# Patient Record
Sex: FEMALE | Race: WHITE | NOT HISPANIC OR LATINO | Employment: UNEMPLOYED | URBAN - METROPOLITAN AREA
[De-identification: names, ages, dates, MRNs, and addresses within clinical notes are randomized per-mention and may not be internally consistent; named-entity substitution may affect disease eponyms.]

---

## 2021-06-06 ENCOUNTER — HISTORICAL (OUTPATIENT)
Dept: ADMINISTRATIVE | Facility: HOSPITAL | Age: 26
End: 2021-06-06

## 2022-04-10 ENCOUNTER — HISTORICAL (OUTPATIENT)
Dept: ADMINISTRATIVE | Facility: HOSPITAL | Age: 27
End: 2022-04-10

## 2022-04-28 VITALS
OXYGEN SATURATION: 100 % | SYSTOLIC BLOOD PRESSURE: 113 MMHG | DIASTOLIC BLOOD PRESSURE: 70 MMHG | HEIGHT: 65 IN | BODY MASS INDEX: 19.98 KG/M2 | WEIGHT: 119.94 LBS

## 2023-09-24 ENCOUNTER — HOSPITAL ENCOUNTER (OUTPATIENT)
Facility: HOSPITAL | Age: 28
Discharge: HOME | End: 2023-09-25
Attending: EMERGENCY MEDICINE | Admitting: OBSTETRICS & GYNECOLOGY
Payer: MEDICAID

## 2023-09-24 DIAGNOSIS — O26.899 ABDOMINAL PAIN AFFECTING PREGNANCY: ICD-10-CM

## 2023-09-24 DIAGNOSIS — R10.9 ABDOMINAL PAIN AFFECTING PREGNANCY: ICD-10-CM

## 2023-09-24 DIAGNOSIS — R10.31 RLQ ABDOMINAL PAIN: ICD-10-CM

## 2023-09-24 DIAGNOSIS — O20.0 THREATENED MISCARRIAGE: Primary | ICD-10-CM

## 2023-09-24 DIAGNOSIS — O36.80X0 PREGNANCY OF UNKNOWN ANATOMIC LOCATION: ICD-10-CM

## 2023-09-24 PROBLEM — D25.9 UTERINE FIBROID DURING PREGNANCY, ANTEPARTUM: Status: ACTIVE | Noted: 2023-09-24

## 2023-09-24 PROBLEM — O26.91 ABNORMAL PREGNANCY IN FIRST TRIMESTER: Status: ACTIVE | Noted: 2023-09-24

## 2023-09-24 PROBLEM — Z87.42 HISTORY OF OVARIAN CYST: Status: ACTIVE | Noted: 2023-09-24

## 2023-09-24 PROBLEM — O20.9 VAGINAL BLEEDING AFFECTING EARLY PREGNANCY: Status: ACTIVE | Noted: 2023-09-24

## 2023-09-24 PROBLEM — O34.10 UTERINE FIBROID DURING PREGNANCY, ANTEPARTUM: Status: ACTIVE | Noted: 2023-09-24

## 2023-09-24 LAB
ALBUMIN SERPL-MCNC: 4.5 G/DL (ref 3.5–5)
ALBUMIN/GLOB SERPL: 1.5 RATIO (ref 1.1–2)
ALP SERPL-CCNC: 39 UNIT/L (ref 40–150)
ALT SERPL-CCNC: 13 UNIT/L (ref 0–55)
AMPHET UR QL SCN: NEGATIVE
APPEARANCE UR: ABNORMAL
APTT PPP: 27.9 SECONDS (ref 23.2–33.7)
AST SERPL-CCNC: 17 UNIT/L (ref 5–34)
B-HCG FREE SERPL-ACNC: 4390.86 MIU/ML
B-HCG FREE SERPL-ACNC: 5884.78 MIU/ML
B-HCG SERPL QL: POSITIVE
BACTERIA #/AREA URNS AUTO: ABNORMAL /HPF
BARBITURATE SCN PRESENT UR: NEGATIVE
BASOPHILS # BLD AUTO: 0.06 X10(3)/MCL
BASOPHILS # BLD AUTO: 0.09 X10(3)/MCL
BASOPHILS # BLD AUTO: 0.16 X10(3)/MCL
BASOPHILS NFR BLD AUTO: 0.5 %
BASOPHILS NFR BLD AUTO: 0.9 %
BASOPHILS NFR BLD AUTO: 1.4 %
BENZODIAZ UR QL SCN: NEGATIVE
BILIRUB SERPL-MCNC: 0.5 MG/DL
BILIRUB UR QL STRIP.AUTO: NEGATIVE
BUN SERPL-MCNC: 5.9 MG/DL (ref 7–18.7)
C TRACH DNA SPEC QL NAA+PROBE: NOT DETECTED
CALCIUM SERPL-MCNC: 9.4 MG/DL (ref 8.4–10.2)
CANNABINOIDS UR QL SCN: POSITIVE
CHLORIDE SERPL-SCNC: 108 MMOL/L (ref 98–107)
CO2 SERPL-SCNC: 20 MMOL/L (ref 22–29)
COCAINE UR QL SCN: NEGATIVE
COLOR UR AUTO: YELLOW
CREAT SERPL-MCNC: 0.87 MG/DL (ref 0.55–1.02)
EOSINOPHIL # BLD AUTO: 0 X10(3)/MCL (ref 0–0.9)
EOSINOPHIL # BLD AUTO: 0.01 X10(3)/MCL (ref 0–0.9)
EOSINOPHIL # BLD AUTO: 0.17 X10(3)/MCL (ref 0–0.9)
EOSINOPHIL NFR BLD AUTO: 0 %
EOSINOPHIL NFR BLD AUTO: 0.1 %
EOSINOPHIL NFR BLD AUTO: 1.5 %
ERYTHROCYTE [DISTWIDTH] IN BLOOD BY AUTOMATED COUNT: 12.1 % (ref 11.5–17)
ERYTHROCYTE [DISTWIDTH] IN BLOOD BY AUTOMATED COUNT: 12.1 % (ref 11.5–17)
ERYTHROCYTE [DISTWIDTH] IN BLOOD BY AUTOMATED COUNT: 12.3 % (ref 11.5–17)
FENTANYL UR QL SCN: NEGATIVE
FIBRINOGEN PPP-MCNC: 259 MG/DL (ref 210–463)
GFR SERPLBLD CREATININE-BSD FMLA CKD-EPI: >60 MLS/MIN/1.73/M2
GLOBULIN SER-MCNC: 3 GM/DL (ref 2.4–3.5)
GLUCOSE SERPL-MCNC: 147 MG/DL (ref 74–100)
GLUCOSE UR QL STRIP.AUTO: NEGATIVE
GROUP & RH: NORMAL
HCT VFR BLD AUTO: 32.5 % (ref 37–47)
HCT VFR BLD AUTO: 33.1 % (ref 37–47)
HCT VFR BLD AUTO: 43.9 % (ref 37–47)
HGB BLD-MCNC: 11.5 G/DL (ref 12–16)
HGB BLD-MCNC: 11.8 G/DL (ref 12–16)
HGB BLD-MCNC: 15.3 G/DL (ref 12–16)
IMM GRANULOCYTES # BLD AUTO: 0.01 X10(3)/MCL (ref 0–0.04)
IMM GRANULOCYTES # BLD AUTO: 0.04 X10(3)/MCL (ref 0–0.04)
IMM GRANULOCYTES # BLD AUTO: 0.04 X10(3)/MCL (ref 0–0.04)
IMM GRANULOCYTES NFR BLD AUTO: 0.1 %
IMM GRANULOCYTES NFR BLD AUTO: 0.4 %
IMM GRANULOCYTES NFR BLD AUTO: 0.4 %
INR PPP: 1.1
KETONES UR QL STRIP.AUTO: NEGATIVE
LEUKOCYTE ESTERASE UR QL STRIP.AUTO: NEGATIVE
LYMPHOCYTES # BLD AUTO: 2.4 X10(3)/MCL (ref 0.6–4.6)
LYMPHOCYTES # BLD AUTO: 3.16 X10(3)/MCL (ref 0.6–4.6)
LYMPHOCYTES # BLD AUTO: 7.02 X10(3)/MCL (ref 0.6–4.6)
LYMPHOCYTES NFR BLD AUTO: 21.4 %
LYMPHOCYTES NFR BLD AUTO: 32.1 %
LYMPHOCYTES NFR BLD AUTO: 61.2 %
MCH RBC QN AUTO: 32.2 PG (ref 27–31)
MCH RBC QN AUTO: 32.4 PG (ref 27–31)
MCH RBC QN AUTO: 32.5 PG (ref 27–31)
MCHC RBC AUTO-ENTMCNC: 34.9 G/DL (ref 33–36)
MCHC RBC AUTO-ENTMCNC: 35.4 G/DL (ref 33–36)
MCHC RBC AUTO-ENTMCNC: 35.6 G/DL (ref 33–36)
MCV RBC AUTO: 90.9 FL (ref 80–94)
MCV RBC AUTO: 91.8 FL (ref 80–94)
MCV RBC AUTO: 92.4 FL (ref 80–94)
MDMA UR QL SCN: NEGATIVE
MONOCYTES # BLD AUTO: 0.49 X10(3)/MCL (ref 0.1–1.3)
MONOCYTES # BLD AUTO: 0.71 X10(3)/MCL (ref 0.1–1.3)
MONOCYTES # BLD AUTO: 0.96 X10(3)/MCL (ref 0.1–1.3)
MONOCYTES NFR BLD AUTO: 4.4 %
MONOCYTES NFR BLD AUTO: 7.2 %
MONOCYTES NFR BLD AUTO: 8.4 %
N GONORRHOEA DNA SPEC QL NAA+PROBE: NOT DETECTED
NEUTROPHILS # BLD AUTO: 3.15 X10(3)/MCL (ref 2.1–9.2)
NEUTROPHILS # BLD AUTO: 5.82 X10(3)/MCL (ref 2.1–9.2)
NEUTROPHILS # BLD AUTO: 8.24 X10(3)/MCL (ref 2.1–9.2)
NEUTROPHILS NFR BLD AUTO: 27.4 %
NEUTROPHILS NFR BLD AUTO: 59.3 %
NEUTROPHILS NFR BLD AUTO: 73.3 %
NITRITE UR QL STRIP.AUTO: NEGATIVE
NRBC BLD AUTO-RTO: 0 %
OPIATES UR QL SCN: NEGATIVE
PCP UR QL: NEGATIVE
PH UR STRIP.AUTO: 8.5 [PH]
PH UR: 8.5 [PH] (ref 3–11)
PLATELET # BLD AUTO: 244 X10(3)/MCL (ref 130–400)
PLATELET # BLD AUTO: 257 X10(3)/MCL (ref 130–400)
PLATELET # BLD AUTO: 394 X10(3)/MCL (ref 130–400)
PMV BLD AUTO: 10 FL (ref 7.4–10.4)
PMV BLD AUTO: 10.2 FL (ref 7.4–10.4)
PMV BLD AUTO: 9.9 FL (ref 7.4–10.4)
POTASSIUM SERPL-SCNC: 4.2 MMOL/L (ref 3.5–5.1)
PROGEST SERPL-MCNC: 3 NG/ML
PROT SERPL-MCNC: 7.5 GM/DL (ref 6.4–8.3)
PROT UR QL STRIP.AUTO: NEGATIVE
PROTHROMBIN TIME: 14.2 SECONDS (ref 12.5–14.5)
RBC # BLD AUTO: 3.54 X10(6)/MCL (ref 4.2–5.4)
RBC # BLD AUTO: 3.64 X10(6)/MCL (ref 4.2–5.4)
RBC # BLD AUTO: 4.75 X10(6)/MCL (ref 4.2–5.4)
RBC #/AREA URNS AUTO: <5 /HPF
RBC UR QL AUTO: ABNORMAL
SODIUM SERPL-SCNC: 140 MMOL/L (ref 136–145)
SOURCE (OHS): NORMAL
SP GR UR STRIP.AUTO: 1.01 (ref 1–1.03)
SQUAMOUS #/AREA URNS AUTO: 7 /HPF
UROBILINOGEN UR STRIP-ACNC: 0.2
WBC # SPEC AUTO: 11.23 X10(3)/MCL (ref 4.5–11.5)
WBC # SPEC AUTO: 11.47 X10(3)/MCL (ref 4.5–11.5)
WBC # SPEC AUTO: 9.83 X10(3)/MCL (ref 4.5–11.5)
WBC #/AREA URNS AUTO: <5 /HPF

## 2023-09-24 PROCEDURE — 81001 URINALYSIS AUTO W/SCOPE: CPT | Mod: 59 | Performed by: PHYSICIAN ASSISTANT

## 2023-09-24 PROCEDURE — 84702 CHORIONIC GONADOTROPIN TEST: CPT | Mod: 91 | Performed by: OBSTETRICS & GYNECOLOGY

## 2023-09-24 PROCEDURE — 99285 EMERGENCY DEPT VISIT HI MDM: CPT | Mod: 25

## 2023-09-24 PROCEDURE — 63600175 PHARM REV CODE 636 W HCPCS: Performed by: EMERGENCY MEDICINE

## 2023-09-24 PROCEDURE — 96376 TX/PRO/DX INJ SAME DRUG ADON: CPT

## 2023-09-24 PROCEDURE — 85730 THROMBOPLASTIN TIME PARTIAL: CPT | Performed by: OBSTETRICS & GYNECOLOGY

## 2023-09-24 PROCEDURE — 63600175 PHARM REV CODE 636 W HCPCS: Performed by: PHYSICIAN ASSISTANT

## 2023-09-24 PROCEDURE — 84144 ASSAY OF PROGESTERONE: CPT | Performed by: OBSTETRICS & GYNECOLOGY

## 2023-09-24 PROCEDURE — 63600175 PHARM REV CODE 636 W HCPCS: Performed by: OBSTETRICS & GYNECOLOGY

## 2023-09-24 PROCEDURE — 96375 TX/PRO/DX INJ NEW DRUG ADDON: CPT

## 2023-09-24 PROCEDURE — G0378 HOSPITAL OBSERVATION PER HR: HCPCS

## 2023-09-24 PROCEDURE — 85610 PROTHROMBIN TIME: CPT | Performed by: OBSTETRICS & GYNECOLOGY

## 2023-09-24 PROCEDURE — 86901 BLOOD TYPING SEROLOGIC RH(D): CPT | Performed by: EMERGENCY MEDICINE

## 2023-09-24 PROCEDURE — 85384 FIBRINOGEN ACTIVITY: CPT | Performed by: OBSTETRICS & GYNECOLOGY

## 2023-09-24 PROCEDURE — 87591 N.GONORRHOEAE DNA AMP PROB: CPT | Performed by: OBSTETRICS & GYNECOLOGY

## 2023-09-24 PROCEDURE — 80307 DRUG TEST PRSMV CHEM ANLYZR: CPT | Performed by: OBSTETRICS & GYNECOLOGY

## 2023-09-24 PROCEDURE — 80053 COMPREHEN METABOLIC PANEL: CPT | Performed by: PHYSICIAN ASSISTANT

## 2023-09-24 PROCEDURE — 96361 HYDRATE IV INFUSION ADD-ON: CPT

## 2023-09-24 PROCEDURE — 25000003 PHARM REV CODE 250: Performed by: OBSTETRICS & GYNECOLOGY

## 2023-09-24 PROCEDURE — 85025 COMPLETE CBC W/AUTO DIFF WBC: CPT | Mod: 91 | Performed by: OBSTETRICS & GYNECOLOGY

## 2023-09-24 PROCEDURE — 84702 CHORIONIC GONADOTROPIN TEST: CPT | Performed by: EMERGENCY MEDICINE

## 2023-09-24 PROCEDURE — 81025 URINE PREGNANCY TEST: CPT | Performed by: PHYSICIAN ASSISTANT

## 2023-09-24 PROCEDURE — 96374 THER/PROPH/DIAG INJ IV PUSH: CPT

## 2023-09-24 PROCEDURE — 85025 COMPLETE CBC W/AUTO DIFF WBC: CPT | Performed by: PHYSICIAN ASSISTANT

## 2023-09-24 RX ORDER — DEXTROAMPHETAMINE SACCHARATE, AMPHETAMINE ASPARTATE, DEXTROAMPHETAMINE SULFATE AND AMPHETAMINE SULFATE 7.5; 7.5; 7.5; 7.5 MG/1; MG/1; MG/1; MG/1
1 TABLET ORAL 2 TIMES DAILY
Status: ON HOLD | COMMUNITY
Start: 2023-07-12 | End: 2023-09-25 | Stop reason: HOSPADM

## 2023-09-24 RX ORDER — SODIUM CHLORIDE 0.9 % (FLUSH) 0.9 %
10 SYRINGE (ML) INJECTION
Status: DISCONTINUED | OUTPATIENT
Start: 2023-09-24 | End: 2023-09-25 | Stop reason: HOSPADM

## 2023-09-24 RX ORDER — ONDANSETRON 4 MG/1
8 TABLET, ORALLY DISINTEGRATING ORAL EVERY 8 HOURS PRN
Status: DISCONTINUED | OUTPATIENT
Start: 2023-09-24 | End: 2023-09-25 | Stop reason: HOSPADM

## 2023-09-24 RX ORDER — ONDANSETRON 2 MG/ML
4 INJECTION INTRAMUSCULAR; INTRAVENOUS
Status: COMPLETED | OUTPATIENT
Start: 2023-09-24 | End: 2023-09-24

## 2023-09-24 RX ORDER — DEXTROAMPHETAMINE SACCHARATE, AMPHETAMINE ASPARTATE, DEXTROAMPHETAMINE SULFATE AND AMPHETAMINE SULFATE 5; 5; 5; 5 MG/1; MG/1; MG/1; MG/1
1 TABLET ORAL 3 TIMES DAILY
COMMUNITY
Start: 2023-09-15

## 2023-09-24 RX ORDER — HYDROMORPHONE HYDROCHLORIDE 2 MG/ML
1 INJECTION, SOLUTION INTRAMUSCULAR; INTRAVENOUS; SUBCUTANEOUS
Status: COMPLETED | OUTPATIENT
Start: 2023-09-24 | End: 2023-09-24

## 2023-09-24 RX ORDER — PROCHLORPERAZINE EDISYLATE 5 MG/ML
5 INJECTION INTRAMUSCULAR; INTRAVENOUS EVERY 6 HOURS PRN
Status: DISCONTINUED | OUTPATIENT
Start: 2023-09-24 | End: 2023-09-25 | Stop reason: HOSPADM

## 2023-09-24 RX ORDER — HYDROMORPHONE HYDROCHLORIDE 2 MG/ML
0.5 INJECTION, SOLUTION INTRAMUSCULAR; INTRAVENOUS; SUBCUTANEOUS
Status: DISCONTINUED | OUTPATIENT
Start: 2023-09-24 | End: 2023-09-25 | Stop reason: HOSPADM

## 2023-09-24 RX ORDER — IBUPROFEN 600 MG/1
600 TABLET ORAL EVERY 8 HOURS PRN
Status: DISCONTINUED | OUTPATIENT
Start: 2023-09-24 | End: 2023-09-25 | Stop reason: HOSPADM

## 2023-09-24 RX ORDER — DEXTROAMPHETAMINE SACCHARATE, AMPHETAMINE ASPARTATE MONOHYDRATE, DEXTROAMPHETAMINE SULFATE AND AMPHETAMINE SULFATE 7.5; 7.5; 7.5; 7.5 MG/1; MG/1; MG/1; MG/1
30 CAPSULE, EXTENDED RELEASE ORAL
Status: ON HOLD | COMMUNITY
End: 2023-09-25 | Stop reason: HOSPADM

## 2023-09-24 RX ORDER — ACETAMINOPHEN 325 MG/1
650 TABLET ORAL EVERY 8 HOURS PRN
Status: DISCONTINUED | OUTPATIENT
Start: 2023-09-24 | End: 2023-09-25 | Stop reason: HOSPADM

## 2023-09-24 RX ORDER — BUPROPION HYDROCHLORIDE 150 MG/1
150 TABLET ORAL EVERY MORNING
COMMUNITY
Start: 2023-04-03

## 2023-09-24 RX ADMIN — ONDANSETRON 4 MG: 2 INJECTION INTRAMUSCULAR; INTRAVENOUS at 11:09

## 2023-09-24 RX ADMIN — ACETAMINOPHEN 650 MG: 325 TABLET, FILM COATED ORAL at 04:09

## 2023-09-24 RX ADMIN — PROCHLORPERAZINE EDISYLATE 5 MG: 5 INJECTION INTRAMUSCULAR; INTRAVENOUS at 08:09

## 2023-09-24 RX ADMIN — SODIUM CHLORIDE, POTASSIUM CHLORIDE, SODIUM LACTATE AND CALCIUM CHLORIDE 1000 ML: 600; 310; 30; 20 INJECTION, SOLUTION INTRAVENOUS at 11:09

## 2023-09-24 RX ADMIN — HYDROMORPHONE HYDROCHLORIDE 1 MG: 2 INJECTION INTRAMUSCULAR; INTRAVENOUS; SUBCUTANEOUS at 12:09

## 2023-09-24 RX ADMIN — SODIUM CHLORIDE, POTASSIUM CHLORIDE, SODIUM LACTATE AND CALCIUM CHLORIDE 1000 ML: 600; 310; 30; 20 INJECTION, SOLUTION INTRAVENOUS at 12:09

## 2023-09-24 RX ADMIN — HYDROMORPHONE HYDROCHLORIDE 1 MG: 2 INJECTION INTRAMUSCULAR; INTRAVENOUS; SUBCUTANEOUS at 11:09

## 2023-09-24 NOTE — FIRST PROVIDER EVALUATION
"Medical screening examination initiated.  I have conducted a focused provider triage encounter, findings are as follows:    Brief history of present illness:  29 yo female presents to ED for evaluation of lower abdominal pain and vaginal bleeding for the past hour. LMP 1 week ago.     Vitals:    09/24/23 1055   BP: (!) 145/86   Pulse: (!) 127   Resp: (!) 26   Temp: 97.5 °F (36.4 °C)   TempSrc: Tympanic   SpO2: 99%   Weight: 54.4 kg (120 lb)   Height: 5' 6" (1.676 m)       Pertinent physical exam:  Patient is awake and alert and oriented.  Ambulatory to triage.  In no acute distress.      Brief workup plan:  labs, UA, UPT, IVF    Preliminary workup initiated; this workup will be continued and followed by the physician or advanced practice provider that is assigned to the patient when roomed.  "

## 2023-09-24 NOTE — Clinical Note
Diagnosis: Threatened miscarriage [[id number]]   Future Attending Provider: KWAME RIOS [[id number]]   Admitting Provider:: KWAME RIOS [[id number]]   Bed request comments: Please place an
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
## 2023-09-24 NOTE — PROGRESS NOTES
Serial abdominal exam and repeat labs    Pt says her pain has improved significantly, took ibuprofen/tylenol a few hours ago. Rates her pain 4/10. Worse with ambulation. No nausea or vomiting. No weakness, fatigue, dizziness with ambulation. Did have some bleeding when using bathroom, dark colored.     Temp:  [97.5 °F (36.4 °C)-98.1 °F (36.7 °C)] 98.1 °F (36.7 °C)  Pulse:  [] 75  Resp:  [16-27] 18  SpO2:  [96 %-100 %] 96 %  BP: (100-145)/(64-99) 100/64    GEN NAD, AAOx3  PULM unlabored  ABD S/NTTP/no rebound or guarding  EXT no C/C/E BLE      Recent Results (from the past 336 hour(s))   CBC with Differential    Collection Time: 23  5:21 PM   Result Value Ref Range    WBC 11.23 4.50 - 11.50 x10(3)/mcL    Hgb 11.8 (L) 12.0 - 16.0 g/dL    Hct 33.1 (L) 37.0 - 47.0 %    Platelet 257 130 - 400 x10(3)/mcL   CBC with Differential    Collection Time: 23 11:03 AM   Result Value Ref Range    WBC 11.47 4.50 - 11.50 x10(3)/mcL    Hgb 15.3 12.0 - 16.0 g/dL    Hct 43.9 37.0 - 47.0 %    Platelet 394 130 - 400 x10(3)/mcL     UDS shows THC+  Fibrinogen 259  Progesterone 3    PTT/INR &PT pending    A/P  under observation with current working diagnosis of early pregnancy of unknown location PUL in setting of VB, abdominal pain (Ddx: IUP with possible ruptured ovarian cysts causing pain on that side, threatened miscarriage or an ectopic fallopian tubal pregnancy or heterotopic pregnancy). Pt refuses to allow TVUS for better imaging of possible adnexal mass/fibroid. There is a questionable GS in NATHANIEL (no FP or YS).  - received 2L IV fluids in ED, possible hemoconcentration for the drop with her H/H and plts on CBC  - pt NPO currently and will repeat CBC earlier than planned, time changed from 11pm to 8pm  - pain is improved, VSSAF, plan to recheck BHCG quant as well  - again discussed plan to proceed with dx laparoscopy for any clinical or lab changes, with concern for possible rupturing tubal ectopic pregnancy.   Pt agrees although she said she does feel very hungry and wishes to eat if the labs are stable on repeat check.    Ofelia Freedman MD  OB/GYN Hospitalist  9/24/2023  6:43 PM

## 2023-09-24 NOTE — ED PROVIDER NOTES
Encounter Date: 9/24/2023    SCRIBE #1 NOTE: I, Julissa Lockhart, am scribing for, and in the presence of,  Abdifatah Penny III, MD. I have scribed the following portions of the note - Other sections scribed: HPI, ROS, PE, MDM.       History     Chief Complaint   Patient presents with    Abdominal Pain     Lower abdominal pain with vaginal bleeding. LMP last week. Denies dysuria. Denies BC use     28 year old female with a history of ruptured uterine cyst presents to ED complaining of severe RLQ abdominal pain and vaginal bleeding that began 30 minutes ago. Her friend, at bedside, says that there is a chance she could be pregnant.  Pt says that she had vaginal bleeding similar to a period last week.      The history is provided by the patient and a friend. No  was used.   Abdominal Pain  The current episode started just prior to arrival. The onset of the illness was abrupt. The abdominal pain is located in the RLQ. The abdominal pain is relieved by nothing. The other symptoms of the illness include vaginal bleeding. The other symptoms of the illness do not include fever, fatigue, shortness of breath, nausea, vomiting, diarrhea or dysuria.   Symptoms associated with the illness do not include constipation or back pain.     Review of patient's allergies indicates:  Not on File  No past medical history on file.  No past surgical history on file.  No family history on file.     Review of Systems   Constitutional:  Negative for fatigue, fever and unexpected weight change.   HENT:  Negative for congestion and rhinorrhea.    Eyes:  Negative for pain.   Respiratory:  Negative for chest tightness, shortness of breath and wheezing.    Cardiovascular:  Negative for chest pain.   Gastrointestinal:  Positive for abdominal pain. Negative for constipation, diarrhea, nausea and vomiting.   Genitourinary:  Positive for vaginal bleeding. Negative for dysuria.   Musculoskeletal:  Negative for back pain and neck  pain.   Skin:  Negative for rash.   Allergic/Immunologic: Negative for environmental allergies, food allergies and immunocompromised state.   Neurological:  Negative for dizziness and speech difficulty.   Hematological:  Does not bruise/bleed easily.   Psychiatric/Behavioral:  Negative for sleep disturbance and suicidal ideas.        Physical Exam     Initial Vitals [09/24/23 1055]   BP Pulse Resp Temp SpO2   (!) 145/86 (!) 127 (!) 26 97.5 °F (36.4 °C) 99 %      MAP       --         Physical Exam    Nursing note and vitals reviewed.  Constitutional: No distress.   Screaming in pain   HENT:   Head: Normocephalic and atraumatic.   Neck: Trachea normal.   Cardiovascular:  Normal rate and regular rhythm.           No murmur heard.  Pulmonary/Chest: Breath sounds normal. No respiratory distress.   Abdominal: Abdomen is soft. Bowel sounds are normal. She exhibits no distension. There is generalized abdominal tenderness.   Musculoskeletal:         General: Normal range of motion.      Lumbar back: Normal range of motion.     Neurological: She is alert and oriented to person, place, and time. She has normal strength. No cranial nerve deficit.   Skin: Skin is warm and dry. No rash noted.   Psychiatric: She has a normal mood and affect. Judgment normal.         ED Course   Critical Care    Date/Time: 9/24/2023 2:26 PM    Performed by: Abdifatah Penny III, MD  Authorized by: Abdifatah Penny III, MD  Direct patient critical care time: 25 minutes  Documentation critical care time: 5 minutes  Consulting other physicians critical care time: 5 minutes  Total critical care time (exclusive of procedural time) : 35 minutes  Critical care was necessary to treat or prevent imminent or life-threatening deterioration of the following conditions: metabolic crisis.  Critical care was time spent personally by me on the following activities: discussions with consultants, examination of patient, re-evaluation of patient's condition, review  of old charts, ordering and review of laboratory studies, ordering and performing treatments and interventions, evaluation of patient's response to treatment and interpretation of cardiac output measurements.        Labs Reviewed   COMPREHENSIVE METABOLIC PANEL - Abnormal; Notable for the following components:       Result Value    Chloride 108 (*)     Carbon Dioxide 20 (*)     Glucose Level 147 (*)     Blood Urea Nitrogen 5.9 (*)     Alkaline Phosphatase 39 (*)     All other components within normal limits   URINALYSIS, REFLEX TO URINE CULTURE - Abnormal; Notable for the following components:    Appearance, UA Cloudy (*)     Blood, UA Trace (*)     All other components within normal limits   PREGNANCY TEST, URINE RAPID - Abnormal; Notable for the following components:    Beta hCG Qualitative, Urine Positive (*)     All other components within normal limits   CBC WITH DIFFERENTIAL - Abnormal; Notable for the following components:    MCH 32.2 (*)     Lymph # 7.02 (*)     All other components within normal limits   URINALYSIS, MICROSCOPIC - Abnormal; Notable for the following components:    Squamous Epithelial Cells, UA 7 (*)     Bacteria, UA 2+ (*)     All other components within normal limits   HCG, QUANTITATIVE - Abnormal; Notable for the following components:    Beta Human Chorionic Gonadotropin Quantitative 5,884.78 (*)     All other components within normal limits   CHLAMYDIA/GONORRHOEAE(GC), PCR   CBC W/ AUTO DIFFERENTIAL    Narrative:     The following orders were created for panel order CBC auto differential.  Procedure                               Abnormality         Status                     ---------                               -----------         ------                     CBC with Differential[[id number]]        Abnormal            Final result                 Please view results for these tests on the individual orders.   PROGESTERONE (IN-HOUSE)   FIBRINOGEN   DRUG SCREEN, URINE ([facility])   GROUP & RH           Imaging Results              US OB <14 Wks, TransAbd, Single Gestation (Final result)  Result time 09/24/23 14:08:41      Final result by Dell Ramírez MD (09/24/23 14:08:41)                   Impression:      Limited evaluation in transabdominal views only.  There is a cystic structure within the endometrial canal possibly representing an early gestational sac.  This measures 6 weeks 0 days by today's measurements.  There is no fetal pole or yolk sac identified.  Recommend trending beta HCG and possible short-term follow-up pelvic ultrasound.      Electronically signed by: Dell Ramírez MD  Date:    09/24/2023  Time:    14:08               Narrative:    EXAMINATION:  US OB <14 WEEKS TRANSABDOM, SINGLE GESTATION    CLINICAL HISTORY:  Right lower quadrant pain    TECHNIQUE:  Grayscale and color Doppler images of the obstetric pelvis are obtained.    COMPARISON:  None    FINDINGS:  The uterus measures 7.2 x 4.4 hot cm.  The endometrium is homogeneous.  There is a possible cystic region within the endometrium which may represent an early gestational sac.  This measures 12 mm corresponding to an estimated gestational age of 6 weeks 0 days..  No fetal pole or yolk sac is identified, however evaluation is limited on transabdominal views only.  There is a pedunculated fibroid measuring 7.6 x 5.4 x 5.2 cm in the right hemipelvis.  The right ovary is not visualized.  The left ovary measures 3.2 x 1.7 x 2.8 cm.  There is normal flow in the left ovary.                                       Medications   lactated ringers bolus 1,000 mL (0 mLs Intravenous Stopped 9/24/23 1232)   ondansetron injection 4 mg (4 mg Intravenous Given 9/24/23 1109)   HYDROmorphone (PF) injection 1 mg (1 mg Intravenous Given 9/24/23 1119)   HYDROmorphone (PF) injection 1 mg (1 mg Intravenous Given 9/24/23 1200)   lactated ringers bolus 1,000 mL (0 mLs Intravenous Stopped 9/24/23 1400)     Medical Decision Making  Differential diagnosis includes,  but is not limited to: ruptured cyst, pyelonephritis active miscarriage ectopic pregnancy    Patient and significant amount of pain with significant abdominal tenderness and guarding.  Patient UPT positive did miss her cycle last month had a full cycle last week she is had and then had some bleeding yesterday.  Patient is sexually active concern for ectopic pregnancy was given IV fluids and Dilaudid no hypotensive events.    Unable to initially get ultrasounds secondary to declining vaginal probe and with a decompressed bladder was given IV fluids ultrasound reveals free fluid possible pedunculated fibroid questionable gestational sac    Amount and/or Complexity of Data Reviewed  Independent Historian: friend     Details: Her friend, at bedside, says that there is a chance she could be pregnant.   Labs: ordered.  Radiology: ordered.    Risk  Prescription drug management.            Scribe Attestation:   Scribe #1: I performed the above scribed service and the documentation accurately describes the services I performed. I attest to the accuracy of the note.    Attending Attestation:           Physician Attestation for Scribe:  Physician Attestation Statement for Scribe #1: I, Abdifatah Penny III, MD, reviewed documentation, as scribed by Julissa Lockhart in my presence, and it is both accurate and complete.             ED Course as of 09/24/23 1441   Sun Sep 24, 2023   1414 Paged OB on call. [BP]      ED Course User Index  [BP] Julissa Lockhart                    Clinical Impression:   Final diagnoses:  [R10.31] RLQ abdominal pain  [O20.0] Threatened miscarriage (Primary)  [O26.899, R10.9] Abdominal pain affecting pregnancy        ED Disposition Condition    Observation                 Abdifatah Penny III, MD  09/24/23 1442

## 2023-09-24 NOTE — ED NOTES
Heating pad provided for comfort. States immediate relief, Then pain returned. MD notified for continued pain.

## 2023-09-25 VITALS
SYSTOLIC BLOOD PRESSURE: 107 MMHG | WEIGHT: 120 LBS | DIASTOLIC BLOOD PRESSURE: 67 MMHG | TEMPERATURE: 98 F | RESPIRATION RATE: 18 BRPM | HEART RATE: 83 BPM | BODY MASS INDEX: 19.29 KG/M2 | HEIGHT: 66 IN | OXYGEN SATURATION: 96 %

## 2023-09-25 LAB
B-HCG FREE SERPL-ACNC: 4892.17 MIU/ML
BASOPHILS # BLD AUTO: 0.08 X10(3)/MCL
BASOPHILS NFR BLD AUTO: 1.1 %
EOSINOPHIL # BLD AUTO: 0.08 X10(3)/MCL (ref 0–0.9)
EOSINOPHIL NFR BLD AUTO: 1.1 %
ERYTHROCYTE [DISTWIDTH] IN BLOOD BY AUTOMATED COUNT: 12.4 % (ref 11.5–17)
HCT VFR BLD AUTO: 34 % (ref 37–47)
HGB BLD-MCNC: 11.6 G/DL (ref 12–16)
IMM GRANULOCYTES # BLD AUTO: 0.01 X10(3)/MCL (ref 0–0.04)
IMM GRANULOCYTES NFR BLD AUTO: 0.1 %
LYMPHOCYTES # BLD AUTO: 2.83 X10(3)/MCL (ref 0.6–4.6)
LYMPHOCYTES NFR BLD AUTO: 40.1 %
MCH RBC QN AUTO: 31.7 PG (ref 27–31)
MCHC RBC AUTO-ENTMCNC: 34.1 G/DL (ref 33–36)
MCV RBC AUTO: 92.9 FL (ref 80–94)
MONOCYTES # BLD AUTO: 0.63 X10(3)/MCL (ref 0.1–1.3)
MONOCYTES NFR BLD AUTO: 8.9 %
NEUTROPHILS # BLD AUTO: 3.42 X10(3)/MCL (ref 2.1–9.2)
NEUTROPHILS NFR BLD AUTO: 48.7 %
NRBC BLD AUTO-RTO: 0 %
PLATELET # BLD AUTO: 242 X10(3)/MCL (ref 130–400)
PMV BLD AUTO: 10 FL (ref 7.4–10.4)
RBC # BLD AUTO: 3.66 X10(6)/MCL (ref 4.2–5.4)
WBC # SPEC AUTO: 7.05 X10(3)/MCL (ref 4.5–11.5)

## 2023-09-25 PROCEDURE — G0378 HOSPITAL OBSERVATION PER HR: HCPCS

## 2023-09-25 PROCEDURE — 84702 CHORIONIC GONADOTROPIN TEST: CPT | Performed by: OBSTETRICS & GYNECOLOGY

## 2023-09-25 PROCEDURE — 85025 COMPLETE CBC W/AUTO DIFF WBC: CPT | Performed by: OBSTETRICS & GYNECOLOGY

## 2023-09-25 RX ORDER — ACETAMINOPHEN 325 MG/1
650 TABLET ORAL EVERY 8 HOURS PRN
Qty: 30 TABLET | Refills: 0 | Status: SHIPPED | OUTPATIENT
Start: 2023-09-25

## 2023-09-25 NOTE — PROGRESS NOTES
Pt given and explained d/c instructions.  OBGYN resident at  to explain appt time that she will call her with that and when and where to draw blood work.  Pt states she reports understanding.

## 2023-09-25 NOTE — NURSING
Pt states she decided on going home and following up with [facility] for u/s and labs on Wednesday.  Dr. Robbins notified of pt decision.  They will make appt and let me know
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
## 2023-09-25 NOTE — DISCHARGE SUMMARY
[facility] - Antepartum  Discharge Summary  Gynecology      Admit Date: 2023    Discharge Date and Time:  2023 10:17 AM    Attending Physician: Ofelia Freedman MD     Discharge Provider: Kassidy López    Reason for Admission: Pregnancy of Unknown Location    Procedures Performed: * No surgery found *    Hospital Course (synopsis of major diagnoses, care, treatment, and services provided during the course of the hospital stay): 27 yo  who presented with RLQ pain and positive UPT. Her beta hcg was found to be 5584 and abdominal ultrasound was remarkable for possible gestational sac in the uterus. R adnexa was found to have a heterogenous mass interpreted as pedunculated fibroid, but suspicious for hemorrhagic cyst vs other structure. She was admitted for serial abdominal exams, CBCs, and trended beta hcg. Beta hcg was found to be 4892 12 hours after initial draw. CBC's were found to be stable and abdominal exam was improved with patient reporting no more abdominal pain. She was counseled extensively on dx of PUL with possible abnormal IUP vs ectopic pregnancy. She was counseled on R/B/I/A of management options including expectant management with trending beta hcg and repeat US vs suction D&C for further diagnostic classification this admission. Patient declines D&C and reports that she has transportation and this ability to follow up in clinic and have labs drawn outpatient. On HD#2 she was found to be stable and meeting milestones for discharge with outpatient follow up for PUL.     Consults: none    Significant Diagnostic Studies: Radiology: Ultrasound: The uterus measures 7.2 x 4.4 hot cm.  The endometrium is homogeneous.  There is a possible cystic region within the endometrium which may represent an early gestational sac.  This measures 12 mm corresponding to an estimated gestational age of 6 weeks 0 days..  No fetal pole or yolk sac is identified, however evaluation is  limited on transabdominal views only.  There is a pedunculated fibroid measuring 7.6 x 5.4 x 5.2 cm in the right hemipelvis.  The right ovary is not visualized.  The left ovary measures 3.2 x 1.7 x 2.8 cm.  There is normal flow in the left ovary.     Impression:     Limited evaluation in transabdominal views only.  There is a cystic structure within the endometrial canal possibly representing an early gestational sac.  This measures 6 weeks 0 days by today's measurements.  There is no fetal pole or yolk sac identified.  Recommend trending beta HCG and possible short-term follow-up pelvic ultrasound.    Final Diagnoses:   Principal Problem: Abnormal pregnancy in first trimester, Pregnancy of unknown location   Secondary Diagnoses:   Active Hospital Problems    Diagnosis  POA    *Abnormal pregnancy in first trimester [O26.91]  No    Pregnancy of unknown anatomic location [O36.80X0]  Yes    Vaginal bleeding affecting early pregnancy [O20.9]  Yes    Abdominal pain complicating pregnancy [O26.899, R10.9]  Yes    History of ovarian cyst [Z87.42]  Not Applicable    Uterine fibroid during pregnancy, antepartum [O34.10, D25.9]  Yes      Resolved Hospital Problems   No resolved problems to display.       Discharged Condition: good    Disposition: Home or Self Care    Follow Up/Patient Instructions: Follow up in about 1 week at [facility] clinic. Patient ordered for outpatient b-hCG 9/27. Given instructions for where to have drawn.      Medications:  Reconciled Home Medications:      Medication List        ASK your doctor about these medications      buPROPion 150 MG TB24 tablet  Commonly known as: WELLBUTRIN XL  Take 150 mg by mouth every morning.     * dextroamphetamine-amphetamine 30 MG 24 hr capsule  Commonly known as: ADDERALL XR  Take 30 mg by mouth.     * dextroamphetamine-amphetamine 30 mg Tab  Take 1 tablet by mouth 2 (two) times daily.     * dextroamphetamine-amphetamine 20 mg tablet  Commonly known as: ADDERALL  Take 1  tablet by mouth 3 (three) times daily.           * This list has 3 medication(s) that are the same as other medications prescribed for you. Read the directions carefully, and ask your doctor or other care provider to review them with you.                No discharge procedures on file.    Kassidy López, DO  [employer] [facility] PGY-2

## 2023-09-25 NOTE — DISCHARGE INSTRUCTIONS
Follow up at [facility] clinic-OBGYN resident will call you with appointment time and instructions on lab draw.

## 2023-09-26 ENCOUNTER — TELEPHONE (OUTPATIENT)
Dept: OBSTETRICS AND GYNECOLOGY | Facility: HOSPITAL | Age: 28
End: 2023-09-26
Payer: MEDICAID

## 2023-09-26 NOTE — TELEPHONE ENCOUNTER
Physician called patient to inform her of appointment scheduled for 10/11/2023.  Reminded her that labs are due to be drawn tomorrow at outpatient lab.  She reports that her pain has been improving feeling some abdominal soreness which is minimal.  She reports some heavier vaginal bleeding last night but states it is minimal today no passage of clots.  She denies any other symptoms at this time voices no questions or concerns and is in agreement with and states understanding of the plan.  All questions answered.    Kassidy López, DO  [employer] [facility] PGY-2

## 2023-09-28 ENCOUNTER — HOSPITAL ENCOUNTER (EMERGENCY)
Facility: HOSPITAL | Age: 28
Discharge: LEFT BEFORE DISCHARGE | End: 2023-09-28
Payer: MEDICAID

## 2023-09-28 VITALS
TEMPERATURE: 100 F | WEIGHT: 120 LBS | SYSTOLIC BLOOD PRESSURE: 98 MMHG | HEART RATE: 78 BPM | DIASTOLIC BLOOD PRESSURE: 62 MMHG | HEIGHT: 65 IN | OXYGEN SATURATION: 98 % | BODY MASS INDEX: 19.99 KG/M2 | RESPIRATION RATE: 16 BRPM

## 2023-09-28 DIAGNOSIS — R55 SYNCOPE AND COLLAPSE: ICD-10-CM

## 2023-09-28 LAB
ALBUMIN SERPL-MCNC: 4.1 G/DL (ref 3.5–5)
ALBUMIN/GLOB SERPL: 1.7 RATIO (ref 1.1–2)
ALP SERPL-CCNC: 37 UNIT/L (ref 40–150)
ALT SERPL-CCNC: 10 UNIT/L (ref 0–55)
AST SERPL-CCNC: 13 UNIT/L (ref 5–34)
B-HCG FREE SERPL-ACNC: 4036.25 MIU/ML
BASOPHILS # BLD AUTO: 0.08 X10(3)/MCL
BASOPHILS NFR BLD AUTO: 1.2 %
BILIRUB SERPL-MCNC: 0.4 MG/DL
BUN SERPL-MCNC: 8.1 MG/DL (ref 7–18.7)
CALCIUM SERPL-MCNC: 9.1 MG/DL (ref 8.4–10.2)
CHLORIDE SERPL-SCNC: 108 MMOL/L (ref 98–107)
CO2 SERPL-SCNC: 24 MMOL/L (ref 22–29)
CREAT SERPL-MCNC: 0.74 MG/DL (ref 0.55–1.02)
EOSINOPHIL # BLD AUTO: 0.12 X10(3)/MCL (ref 0–0.9)
EOSINOPHIL NFR BLD AUTO: 1.8 %
ERYTHROCYTE [DISTWIDTH] IN BLOOD BY AUTOMATED COUNT: 12.4 % (ref 11.5–17)
GFR SERPLBLD CREATININE-BSD FMLA CKD-EPI: >60 MLS/MIN/1.73/M2
GLOBULIN SER-MCNC: 2.4 GM/DL (ref 2.4–3.5)
GLUCOSE SERPL-MCNC: 79 MG/DL (ref 74–100)
HCT VFR BLD AUTO: 35.5 % (ref 37–47)
HGB BLD-MCNC: 12.6 G/DL (ref 12–16)
IMM GRANULOCYTES # BLD AUTO: 0.02 X10(3)/MCL (ref 0–0.04)
IMM GRANULOCYTES NFR BLD AUTO: 0.3 %
LYMPHOCYTES # BLD AUTO: 2.4 X10(3)/MCL (ref 0.6–4.6)
LYMPHOCYTES NFR BLD AUTO: 35.5 %
MCH RBC QN AUTO: 32.6 PG (ref 27–31)
MCHC RBC AUTO-ENTMCNC: 35.5 G/DL (ref 33–36)
MCV RBC AUTO: 91.7 FL (ref 80–94)
MONOCYTES # BLD AUTO: 0.64 X10(3)/MCL (ref 0.1–1.3)
MONOCYTES NFR BLD AUTO: 9.5 %
NEUTROPHILS # BLD AUTO: 3.51 X10(3)/MCL (ref 2.1–9.2)
NEUTROPHILS NFR BLD AUTO: 51.7 %
NRBC BLD AUTO-RTO: 0 %
PLATELET # BLD AUTO: 246 X10(3)/MCL (ref 130–400)
PMV BLD AUTO: 9.8 FL (ref 7.4–10.4)
POTASSIUM SERPL-SCNC: 4.1 MMOL/L (ref 3.5–5.1)
PROT SERPL-MCNC: 6.5 GM/DL (ref 6.4–8.3)
RBC # BLD AUTO: 3.87 X10(6)/MCL (ref 4.2–5.4)
SODIUM SERPL-SCNC: 140 MMOL/L (ref 136–145)
WBC # SPEC AUTO: 6.77 X10(3)/MCL (ref 4.5–11.5)

## 2023-09-28 PROCEDURE — 93010 ELECTROCARDIOGRAM REPORT: CPT | Mod: ,,, | Performed by: INTERNAL MEDICINE

## 2023-09-28 PROCEDURE — 84702 CHORIONIC GONADOTROPIN TEST: CPT | Performed by: EMERGENCY MEDICINE

## 2023-09-28 PROCEDURE — 99284 EMERGENCY DEPT VISIT MOD MDM: CPT

## 2023-09-28 PROCEDURE — 93005 ELECTROCARDIOGRAM TRACING: CPT

## 2023-09-28 PROCEDURE — 80053 COMPREHEN METABOLIC PANEL: CPT | Performed by: EMERGENCY MEDICINE

## 2023-09-28 PROCEDURE — 85025 COMPLETE CBC W/AUTO DIFF WBC: CPT | Performed by: EMERGENCY MEDICINE

## 2023-09-28 PROCEDURE — 93010 EKG 12-LEAD: ICD-10-PCS | Mod: ,,, | Performed by: INTERNAL MEDICINE

## 2023-09-29 NOTE — FIRST PROVIDER EVALUATION
"Medical screening examination initiated.  I have conducted a focused provider triage encounter, findings are as follows:    Brief history of present illness:  arrived to ED via EMS due to multiple syncopal episodes that occurred while out tonight. Approximately 6 weeks pregnant. Recently hospitalized for abdominal pain. Patient denies abdominal pain or vaginal bleeding currently. .    Vitals:    23 1935   BP: 98/62   Pulse: 78   Resp: 16   Temp: 99.9 °F (37.7 °C)   TempSrc: Oral   SpO2: 98%   Weight: 54.4 kg (120 lb)   Height: 5' 5" (1.651 m)       Pertinent physical exam:  awake, alert, has non-labored breathing, and follows commands. GCS 15.     Brief workup plan:  labs & medication    Preliminary workup initiated; this workup will be continued and followed by the physician or advanced practice provider that is assigned to the patient when roomed.  "

## 2023-09-29 NOTE — ED NOTES
Patient requesting to leave, states that she has an appointment in the morning with her OB. Denies any complaints of abdominal pain or dizziness. Ambulatory with steady gait, AAOx4, and IV removed. Patient friend is with her to drive her home. Encouraged patient to return if she began to have abdominal pain, heavy bleeding, worsening symptoms

## 2023-09-30 ENCOUNTER — HOSPITAL ENCOUNTER (EMERGENCY)
Facility: HOSPITAL | Age: 28
Discharge: HOME | End: 2023-09-30
Attending: EMERGENCY MEDICINE
Payer: MEDICAID

## 2023-09-30 VITALS
BODY MASS INDEX: 19.99 KG/M2 | DIASTOLIC BLOOD PRESSURE: 74 MMHG | OXYGEN SATURATION: 99 % | TEMPERATURE: 98 F | HEIGHT: 65 IN | SYSTOLIC BLOOD PRESSURE: 122 MMHG | HEART RATE: 95 BPM | RESPIRATION RATE: 19 BRPM | WEIGHT: 120 LBS

## 2023-09-30 DIAGNOSIS — O03.9 MISCARRIAGE: Primary | ICD-10-CM

## 2023-09-30 LAB
ALBUMIN SERPL-MCNC: 3.8 G/DL (ref 3.5–5)
ALBUMIN/GLOB SERPL: 1.6 RATIO (ref 1.1–2)
ALP SERPL-CCNC: 36 UNIT/L (ref 40–150)
ALT SERPL-CCNC: 14 UNIT/L (ref 0–55)
AST SERPL-CCNC: 16 UNIT/L (ref 5–34)
B-HCG FREE SERPL-ACNC: 3182.74 MIU/ML
BASOPHILS # BLD AUTO: 0.08 X10(3)/MCL
BASOPHILS NFR BLD AUTO: 0.7 %
BILIRUB SERPL-MCNC: 0.3 MG/DL
BUN SERPL-MCNC: 7.6 MG/DL (ref 7–18.7)
CALCIUM SERPL-MCNC: 8.9 MG/DL (ref 8.4–10.2)
CHLORIDE SERPL-SCNC: 110 MMOL/L (ref 98–107)
CO2 SERPL-SCNC: 25 MMOL/L (ref 22–29)
CREAT SERPL-MCNC: 0.68 MG/DL (ref 0.55–1.02)
EOSINOPHIL # BLD AUTO: 0.09 X10(3)/MCL (ref 0–0.9)
EOSINOPHIL NFR BLD AUTO: 0.8 %
ERYTHROCYTE [DISTWIDTH] IN BLOOD BY AUTOMATED COUNT: 12.6 % (ref 11.5–17)
GFR SERPLBLD CREATININE-BSD FMLA CKD-EPI: >60 MLS/MIN/1.73/M2
GLOBULIN SER-MCNC: 2.4 GM/DL (ref 2.4–3.5)
GLUCOSE SERPL-MCNC: 90 MG/DL (ref 74–100)
HCT VFR BLD AUTO: 34.7 % (ref 37–47)
HGB BLD-MCNC: 12.1 G/DL (ref 12–16)
IMM GRANULOCYTES # BLD AUTO: 0.03 X10(3)/MCL (ref 0–0.04)
IMM GRANULOCYTES NFR BLD AUTO: 0.3 %
LYMPHOCYTES # BLD AUTO: 2.67 X10(3)/MCL (ref 0.6–4.6)
LYMPHOCYTES NFR BLD AUTO: 22.4 %
MCH RBC QN AUTO: 32.6 PG (ref 27–31)
MCHC RBC AUTO-ENTMCNC: 34.9 G/DL (ref 33–36)
MCV RBC AUTO: 93.5 FL (ref 80–94)
MONOCYTES # BLD AUTO: 0.93 X10(3)/MCL (ref 0.1–1.3)
MONOCYTES NFR BLD AUTO: 7.8 %
NEUTROPHILS # BLD AUTO: 8.13 X10(3)/MCL (ref 2.1–9.2)
NEUTROPHILS NFR BLD AUTO: 68 %
NRBC BLD AUTO-RTO: 0 %
PLATELET # BLD AUTO: 235 X10(3)/MCL (ref 130–400)
PMV BLD AUTO: 9.9 FL (ref 7.4–10.4)
POTASSIUM SERPL-SCNC: 3.7 MMOL/L (ref 3.5–5.1)
PROT SERPL-MCNC: 6.2 GM/DL (ref 6.4–8.3)
RBC # BLD AUTO: 3.71 X10(6)/MCL (ref 4.2–5.4)
SODIUM SERPL-SCNC: 140 MMOL/L (ref 136–145)
WBC # SPEC AUTO: 11.93 X10(3)/MCL (ref 4.5–11.5)

## 2023-09-30 PROCEDURE — 99285 EMERGENCY DEPT VISIT HI MDM: CPT | Mod: 25

## 2023-09-30 PROCEDURE — 84702 CHORIONIC GONADOTROPIN TEST: CPT | Performed by: NURSE PRACTITIONER

## 2023-09-30 PROCEDURE — 96360 HYDRATION IV INFUSION INIT: CPT

## 2023-09-30 PROCEDURE — 85025 COMPLETE CBC W/AUTO DIFF WBC: CPT | Performed by: NURSE PRACTITIONER

## 2023-09-30 PROCEDURE — 25000003 PHARM REV CODE 250: Performed by: NURSE PRACTITIONER

## 2023-09-30 PROCEDURE — 80053 COMPREHEN METABOLIC PANEL: CPT | Performed by: NURSE PRACTITIONER

## 2023-09-30 RX ORDER — HYDROCODONE BITARTRATE AND ACETAMINOPHEN 7.5; 325 MG/1; MG/1
1 TABLET ORAL EVERY 6 HOURS PRN
Qty: 16 TABLET | Refills: 0 | Status: SHIPPED | OUTPATIENT
Start: 2023-09-30 | End: 2023-10-04

## 2023-09-30 RX ADMIN — SODIUM CHLORIDE 1000 ML: 9 INJECTION, SOLUTION INTRAVENOUS at 03:09

## 2023-09-30 NOTE — ED PROVIDER NOTES
Encounter Date: 2023       History     Chief Complaint   Patient presents with    Abdominal Pain     C/O vaginal spotting and lower abd/vag pain. , approx 6 weeks preg. 100mcg fentanyl given     See MDM    The history is provided by the patient. No  was used.     Review of patient's allergies indicates:  No Known Allergies  Past Medical History:   Diagnosis Date    ADHD     Anxiety disorder, unspecified     Hyperthyroidism     Ovarian cyst      History reviewed. No pertinent surgical history.  Family History   Problem Relation Age of Onset    Hypothyroidism Mother     No Known Problems Father     Hypothyroidism Sister     Diabetes Maternal Grandmother      Social History     Tobacco Use    Smoking status: Former     Current packs/day: 0.00     Types: Cigarettes     Quit date:      Years since quittin.7    Smokeless tobacco: Current   Substance Use Topics    Alcohol use: Yes     Alcohol/week: 1.0 standard drink of alcohol     Types: 1 Shots of liquor per week    Drug use: Yes     Frequency: 7.0 times per week     Types: Marijuana     Review of Systems   Constitutional:  Negative for fever.   Respiratory:  Negative for cough and shortness of breath.    Cardiovascular:  Negative for chest pain.   Gastrointestinal:  Positive for abdominal pain.   Genitourinary:  Positive for vaginal bleeding. Negative for difficulty urinating and dysuria.   Musculoskeletal:  Negative for gait problem.   Skin:  Negative for color change.   Neurological:  Negative for dizziness, speech difficulty and headaches.   Psychiatric/Behavioral:  Negative for hallucinations and suicidal ideas.    All other systems reviewed and are negative.      Physical Exam     Initial Vitals [23 1350]   BP Pulse Resp Temp SpO2   124/86 (!) 120 19 97.9 °F (36.6 °C) 99 %      MAP       --         Physical Exam    Nursing note and vitals reviewed.  Constitutional: She appears well-developed and well-nourished.   HENT:    Head: Normocephalic.   Eyes: EOM are normal.   Neck:   Normal range of motion.  Cardiovascular:  Normal rate, regular rhythm, normal heart sounds and intact distal pulses.           Pulmonary/Chest: Breath sounds normal. No respiratory distress.   Abdominal: Abdomen is soft. Bowel sounds are normal. There is abdominal tenderness.   Musculoskeletal:         General: Normal range of motion.      Cervical back: Normal range of motion.     Neurological: She is alert and oriented to person, place, and time. She has normal strength.   Skin: Skin is warm and dry.   Psychiatric: She has a normal mood and affect. Her behavior is normal. Judgment and thought content normal.         ED Course   Procedures  Labs Reviewed   COMPREHENSIVE METABOLIC PANEL - Abnormal; Notable for the following components:       Result Value    Chloride 110 (*)     Protein Total 6.2 (*)     Alkaline Phosphatase 36 (*)     All other components within normal limits   HCG, QUANTITATIVE - Abnormal; Notable for the following components:    Beta Human Chorionic Gonadotropin Quantitative 3,182.74 (*)     All other components within normal limits   CBC WITH DIFFERENTIAL - Abnormal; Notable for the following components:    WBC 11.93 (*)     RBC 3.71 (*)     Hct 34.7 (*)     MCH 32.6 (*)     All other components within normal limits   CBC W/ AUTO DIFFERENTIAL    Narrative:     The following orders were created for panel order CBC auto differential.  Procedure                               Abnormality         Status                     ---------                               -----------         ------                     CBC with Differential[[id number]]       Abnormal            Final result                 Please view results for these tests on the individual orders.          Imaging Results              US OB <14 Wks, TransAbd, Single Gestation (Final result)  Result time 09/30/23 16:53:06   Procedure changed from US OB <14 Wks TransAbd & TransVag, Single  Gestation (XPD)     Final result by Andi Mccormack MD (09/30/23 16:53:06)                   Narrative:    EXAMINATION  US OB <14 WEEKS TRANSABDOM, SINGLE GESTATION    CLINICAL HISTORY  products of conception;    LMP: Unknown    TECHNIQUE  Multiplanar grayscale sonographic evaluation of the uterus and adjacent pelvic structures was performed, with limited Doppler interrogation.    COMPARISON  25 September 2023    FINDINGS  Exam quality: adequate for evaluation    No convincing intrauterine gestational sac is identified.  Hypoechoic structure at the anterior uterine wall is again noted, suspected represent fibroid.    There is similar complex appearing structure at the right adnexa measuring up to approximately 5.3 cm in diameter, but now exhibits adjacent adnexal fluid.  Overall appearance remains nonspecific by sonographic assessment and again of indeterminate clinical significance.  Left ovary is unremarkable.    IMPRESSION  1. Continued findings of indeterminate pregnancy location, with no definite gestational sac identified.  2. Hypoechoic structure appearing to reside within the right anterior uterine wall may represent fibroid, otherwise indeterminate.  3. Persistent right adnexal mass-like abnormality now with surrounding fluid but no clear features of fetal pole, yolk sac, or other  Definite gestational component.      Electronically signed by: Andi Mccormack  Date:    09/30/2023  Time:    16:53                                     Medications   sodium chloride 0.9% bolus 1,000 mL 1,000 mL (0 mLs Intravenous Stopped 9/30/23 1642)     Medical Decision Making  Historian:  Patient.  Patient is a 28-year-old female  that presents with currently pregnant with vaginal bleeding that has been present intermittent for a few days. Associated symptoms abdominal pain. Surrounding information is seen on 09/24 and 9/28 with a decreasing hCG level. Exacerbated by nothing. Relieved by nothing. Patient treatment prior to  arrival none. Risk factors include threatened miscarriage. Other history pertaining to this complaint none.   Assessment:  See physical exam.  DD:  Miscarriage, threatened miscarriage, ectopic pregnancy      Amount and/or Complexity of Data Reviewed  Labs: ordered.     Details: Labs are unremarkable  Radiology: ordered.     Details: Ultrasound did say there is a questionable area in the right adnexa  Discussion of management or test interpretation with external provider(s): I discussed this case with Dr. Wilson, emergency room physician, she performed a face-to-face interaction with patient.  Patient did decline a pelvic exam.  Dr. Wilson recommends calling the OB hospitalist.  I spoke to Dr. Nair, OB hospitalist, I discussed findings from , findings from , and the findings of lab studies and ultrasound today.  He feels patient is having a miscarriage and needs to have a repeat hCG level in 7 days.  He recommends riding her some pain medication for at home.  No social determinants that affect healthcare with noted.    Risk  Prescription drug management.              Attending Attestation:     Physician Attestation Statement for NP/PA:   I have directed and reviewed the workup performed by the PA/NP.  I performed the substantive portion of the medical decision making.     Other NP/PA Attestation Additions:    History of Present Illness: See ed course for attestation               ED Course as of 23 1755   Sat Sep 30, 2023   1600 Dr. Wilson supervisory attestation  I performed substantive portion of MDM. I performed a history, physical exam, reviewed pertinent available previous records and discussed plan of care with NP I had face to face time evaluation of the patient    HPI: 27 yo f who is approximately 5-6 wga here for evaluation of rlq pain that began today. She was admitted 1 week ago for threatened , pain had resolved and she was dc with strict return precautions. She was doing  well, never passed tissue or potential poc, has some mild spotting. Has significant trauma/ptsd from previous pelvic exam and tvus  PE:normocephalic, atraumatic  Regular rate, lungs clear  Abd soft, right lower quadrant tenderness with guarding  Pelvic deferred per pt request  MDM:downtrending hcg, rlq pain significant  Transadbominal us  Pelvic deferred due to pt request at this time   [BS]   1620 Pt is a+ per lab on 9/24/23 [BS]   1705 Paged Dr Nair [CL]   1577 Spoke to Dr. Nair OB on-call.  Return or follow-up with [facility] in 7 days for a repeat hCG level.  I can write her something for pain for at home.  He does not feel she needs any procedure at present time [CL]      ED Course User Index  [BS] Cecy Wilson MD  [CL] Bhupendra Upton FNP                      Clinical Impression:   Final diagnoses:  [O03.9] Miscarriage (Primary)        ED Disposition Condition    Discharge Stable          ED Prescriptions    None       Follow-up Information       Follow up With Specialties Details Why Contact Info    Return to the emergency room in 7 days for a repeat hCG level                 Bhupendra Upton FNP  09/30/23 0331       Cecy Wilson MD  09/30/23 6206

## 2023-10-01 ENCOUNTER — TELEPHONE (OUTPATIENT)
Dept: GYNECOLOGY | Facility: CLINIC | Age: 28
End: 2023-10-01
Payer: MEDICAID

## 2023-10-01 DIAGNOSIS — O36.80X0 PREGNANCY, LOCATION UNKNOWN: Primary | ICD-10-CM

## 2023-10-01 NOTE — TELEPHONE ENCOUNTER
Tried to call patient again regarding bHCG trend. Patient did not answer x3, left VM.    Brief patient history:  9/24/2023:   bHCG 5584  Abdominal ultrasound was remarkable for possible gestational sac in the uterus. R adnexa was found to have a heterogenous mass interpreted as pedunculated fibroid, but suspicious for hemorrhagic cyst vs other structure  Admitted from 9/24-9/25: for serial abdominal exams.  9/25/2023: Repeat bHCG 4892. Patient desired expectant management, discharged with plan for repeat beta on 9/27.  9/26: Dr. López called to remind patient about 9/27 bHCG draw, pt verbalized understanding.   9/27: Patient did not get bHCG drawn   9/30: Patient presented to ED w/ vaginal spotting and lower abd/vaginal pain.  bHCG 3,182  Repeat TVUS:   1. Continued findings of indeterminate pregnancy location, with no definite gestational sac identified.  2. Hypoechoic structure appearing to reside within the right anterior uterine wall may represent fibroid, otherwise indeterminate.  3. Persistent right adnexal mass-like abnormality now with surrounding fluid but no clear features of fetal pole, yolk sac, or other definite gestational component.  Plan:  Appropriate bHCG decline. Will continue to bHCG weekly until undetectable. Next draw scheduled for 10/6/2023 with standing order in place. Left voicemail, will try calling again tomorrow.     Dilma Pineda MD  [facility] PGY-2  10/01/2023 12:51 PM

## 2023-10-03 ENCOUNTER — TELEPHONE (OUTPATIENT)
Dept: GYNECOLOGY | Facility: CLINIC | Age: 28
End: 2023-10-03
Payer: MEDICAID

## 2023-10-03 NOTE — TELEPHONE ENCOUNTER
----- Message from Dilma Pineda MD sent at 9/25/2023  4:30 PM CDT -----  Regarding: FW: Follow Up Appointment Scheduling  Zofia Ordoñez,    Can we please schedule follow up for this patient in person in 1 week per Dr. Robbins. Please call patient with time and date.    Thank you so much,    Dilma Pineda MD  [facility] PGY-2  09/25/2023 4:31 PM    ----- Message -----  From: Kassidy López MD  Sent: 9/25/2023  10:29 AM CDT  To: Skylar Sunshine MD; Dilma Pineda MD; #  Subject: Follow Up Appointment Scheduling                 Good morning!     This is a patient that we are discharging from [facility] this AM with PUL. She will have repeat beta drawn Wednesday and will need a follow up appointment in about 1 week.     Please call the patient with the date and time of her appointment.     Thank you!    Kassidy López,   [employer] PGY-2

## 2023-10-06 ENCOUNTER — TELEPHONE (OUTPATIENT)
Dept: GYNECOLOGY | Facility: CLINIC | Age: 28
End: 2023-10-06
Payer: MEDICAID

## 2023-10-06 NOTE — TELEPHONE ENCOUNTER
Attempted to call patient about trending bHCG. Patient did not answer x2. Left VM urging patient to come get beta level drawn today.    Will follow up with results.     Dilma Pineda MD  [facility] PGY-2  10/06/2023 7:50 AM

## 2023-10-08 ENCOUNTER — HOSPITAL ENCOUNTER (OUTPATIENT)
Facility: HOSPITAL | Age: 28
Discharge: HOME | End: 2023-10-09
Attending: EMERGENCY MEDICINE | Admitting: OBSTETRICS & GYNECOLOGY
Payer: MEDICAID

## 2023-10-08 ENCOUNTER — ANESTHESIA EVENT (OUTPATIENT)
Dept: SURGERY | Facility: HOSPITAL | Age: 28
End: 2023-10-08
Payer: MEDICAID

## 2023-10-08 ENCOUNTER — ANESTHESIA (OUTPATIENT)
Dept: SURGERY | Facility: HOSPITAL | Age: 28
End: 2023-10-08
Payer: MEDICAID

## 2023-10-08 DIAGNOSIS — R10.31 ACUTE RIGHT LOWER QUADRANT PAIN: Primary | ICD-10-CM

## 2023-10-08 DIAGNOSIS — O00.90 RUPTURED ECTOPIC PREGNANCY: ICD-10-CM

## 2023-10-08 DIAGNOSIS — D72.829 LEUKOCYTOSIS, UNSPECIFIED TYPE: ICD-10-CM

## 2023-10-08 LAB
ALBUMIN SERPL-MCNC: 4.6 G/DL (ref 3.5–5)
ALBUMIN/GLOB SERPL: 1.6 RATIO (ref 1.1–2)
ALP SERPL-CCNC: 50 UNIT/L (ref 40–150)
ALT SERPL-CCNC: 12 UNIT/L (ref 0–55)
APTT PPP: 27 SECONDS (ref 23.2–33.7)
AST SERPL-CCNC: 16 UNIT/L (ref 5–34)
B-HCG FREE SERPL-ACNC: 829.05 MIU/ML
BASOPHILS # BLD AUTO: 0.14 X10(3)/MCL
BASOPHILS NFR BLD AUTO: 1.1 %
BILIRUB SERPL-MCNC: 0.4 MG/DL
BUN SERPL-MCNC: 9.7 MG/DL (ref 7–18.7)
CALCIUM SERPL-MCNC: 9.5 MG/DL (ref 8.4–10.2)
CHLORIDE SERPL-SCNC: 112 MMOL/L (ref 98–107)
CO2 SERPL-SCNC: 16 MMOL/L (ref 22–29)
CREAT SERPL-MCNC: 0.68 MG/DL (ref 0.55–1.02)
EOSINOPHIL # BLD AUTO: 0.29 X10(3)/MCL (ref 0–0.9)
EOSINOPHIL NFR BLD AUTO: 2.3 %
ERYTHROCYTE [DISTWIDTH] IN BLOOD BY AUTOMATED COUNT: 12.5 % (ref 11.5–17)
GFR SERPLBLD CREATININE-BSD FMLA CKD-EPI: >60 MLS/MIN/1.73/M2
GLOBULIN SER-MCNC: 2.9 GM/DL (ref 2.4–3.5)
GLUCOSE SERPL-MCNC: 101 MG/DL (ref 74–100)
GROUP & RH: NORMAL
HCT VFR BLD AUTO: 39.9 % (ref 37–47)
HGB BLD-MCNC: 14 G/DL (ref 12–16)
IMM GRANULOCYTES # BLD AUTO: 0.03 X10(3)/MCL (ref 0–0.04)
IMM GRANULOCYTES NFR BLD AUTO: 0.2 %
INDIRECT COOMBS GEL: NORMAL
INR PPP: 0.9
LYMPHOCYTES # BLD AUTO: 5.29 X10(3)/MCL (ref 0.6–4.6)
LYMPHOCYTES NFR BLD AUTO: 42.3 %
MCH RBC QN AUTO: 32.6 PG (ref 27–31)
MCHC RBC AUTO-ENTMCNC: 35.1 G/DL (ref 33–36)
MCV RBC AUTO: 92.8 FL (ref 80–94)
MONOCYTES # BLD AUTO: 1.07 X10(3)/MCL (ref 0.1–1.3)
MONOCYTES NFR BLD AUTO: 8.6 %
NEUTROPHILS # BLD AUTO: 5.69 X10(3)/MCL (ref 2.1–9.2)
NEUTROPHILS NFR BLD AUTO: 45.5 %
NRBC BLD AUTO-RTO: 0 %
PLATELET # BLD AUTO: 439 X10(3)/MCL (ref 130–400)
PMV BLD AUTO: 9.5 FL (ref 7.4–10.4)
POTASSIUM SERPL-SCNC: 4.9 MMOL/L (ref 3.5–5.1)
PROT SERPL-MCNC: 7.5 GM/DL (ref 6.4–8.3)
PROTHROMBIN TIME: 12 SECONDS (ref 12.5–14.5)
RBC # BLD AUTO: 4.3 X10(6)/MCL (ref 4.2–5.4)
SODIUM SERPL-SCNC: 139 MMOL/L (ref 136–145)
SPECIMEN OUTDATE: NORMAL
WBC # SPEC AUTO: 12.51 X10(3)/MCL (ref 4.5–11.5)

## 2023-10-08 PROCEDURE — 71000033 HC RECOVERY, INTIAL HOUR: Performed by: OBSTETRICS & GYNECOLOGY

## 2023-10-08 PROCEDURE — 96375 TX/PRO/DX INJ NEW DRUG ADDON: CPT

## 2023-10-08 PROCEDURE — 63600175 PHARM REV CODE 636 W HCPCS: Performed by: EMERGENCY MEDICINE

## 2023-10-08 PROCEDURE — D9220A PRA ANESTHESIA: ICD-10-PCS | Mod: CRNA,,,

## 2023-10-08 PROCEDURE — 85730 THROMBOPLASTIN TIME PARTIAL: CPT | Performed by: EMERGENCY MEDICINE

## 2023-10-08 PROCEDURE — 36000709 HC OR TIME LEV III EA ADD 15 MIN: Performed by: OBSTETRICS & GYNECOLOGY

## 2023-10-08 PROCEDURE — 88305 TISSUE EXAM BY PATHOLOGIST: CPT | Performed by: OBSTETRICS & GYNECOLOGY

## 2023-10-08 PROCEDURE — 85610 PROTHROMBIN TIME: CPT | Performed by: EMERGENCY MEDICINE

## 2023-10-08 PROCEDURE — 85025 COMPLETE CBC W/AUTO DIFF WBC: CPT | Performed by: EMERGENCY MEDICINE

## 2023-10-08 PROCEDURE — D9220A PRA ANESTHESIA: ICD-10-PCS | Mod: ANES,,, | Performed by: ANESTHESIOLOGY

## 2023-10-08 PROCEDURE — 99285 EMERGENCY DEPT VISIT HI MDM: CPT | Mod: 25

## 2023-10-08 PROCEDURE — G0378 HOSPITAL OBSERVATION PER HR: HCPCS

## 2023-10-08 PROCEDURE — 25000003 PHARM REV CODE 250: Performed by: OBSTETRICS & GYNECOLOGY

## 2023-10-08 PROCEDURE — 37000008 HC ANESTHESIA 1ST 15 MINUTES: Performed by: OBSTETRICS & GYNECOLOGY

## 2023-10-08 PROCEDURE — D9220A PRA ANESTHESIA: Mod: CRNA,,,

## 2023-10-08 PROCEDURE — 37000009 HC ANESTHESIA EA ADD 15 MINS: Performed by: OBSTETRICS & GYNECOLOGY

## 2023-10-08 PROCEDURE — 11000001 HC ACUTE MED/SURG PRIVATE ROOM

## 2023-10-08 PROCEDURE — 63600175 PHARM REV CODE 636 W HCPCS: Performed by: ANESTHESIOLOGY

## 2023-10-08 PROCEDURE — 59151 PR RX ECTOP PREG BY SCOPE,RMV TUBE/OVRY: ICD-10-PCS | Mod: ,,, | Performed by: OBSTETRICS & GYNECOLOGY

## 2023-10-08 PROCEDURE — 71000039 HC RECOVERY, EACH ADD'L HOUR: Performed by: OBSTETRICS & GYNECOLOGY

## 2023-10-08 PROCEDURE — 96374 THER/PROPH/DIAG INJ IV PUSH: CPT

## 2023-10-08 PROCEDURE — 25000003 PHARM REV CODE 250

## 2023-10-08 PROCEDURE — 63600175 PHARM REV CODE 636 W HCPCS: Performed by: STUDENT IN AN ORGANIZED HEALTH CARE EDUCATION/TRAINING PROGRAM

## 2023-10-08 PROCEDURE — 25000003 PHARM REV CODE 250: Performed by: ANESTHESIOLOGY

## 2023-10-08 PROCEDURE — 63600175 PHARM REV CODE 636 W HCPCS: Performed by: OBSTETRICS & GYNECOLOGY

## 2023-10-08 PROCEDURE — 80053 COMPREHEN METABOLIC PANEL: CPT | Performed by: EMERGENCY MEDICINE

## 2023-10-08 PROCEDURE — 86901 BLOOD TYPING SEROLOGIC RH(D): CPT | Performed by: EMERGENCY MEDICINE

## 2023-10-08 PROCEDURE — 36000708 HC OR TIME LEV III 1ST 15 MIN: Performed by: OBSTETRICS & GYNECOLOGY

## 2023-10-08 PROCEDURE — D9220A PRA ANESTHESIA: Mod: ANES,,, | Performed by: ANESTHESIOLOGY

## 2023-10-08 PROCEDURE — 63600175 PHARM REV CODE 636 W HCPCS

## 2023-10-08 PROCEDURE — 84702 CHORIONIC GONADOTROPIN TEST: CPT | Performed by: EMERGENCY MEDICINE

## 2023-10-08 PROCEDURE — 27201423 OPTIME MED/SURG SUP & DEVICES STERILE SUPPLY: Performed by: OBSTETRICS & GYNECOLOGY

## 2023-10-08 RX ORDER — MEPERIDINE HYDROCHLORIDE 25 MG/ML
12.5 INJECTION INTRAMUSCULAR; INTRAVENOUS; SUBCUTANEOUS EVERY 10 MIN PRN
Status: DISCONTINUED | OUTPATIENT
Start: 2023-10-08 | End: 2023-10-08 | Stop reason: HOSPADM

## 2023-10-08 RX ORDER — DIPHENHYDRAMINE HYDROCHLORIDE 50 MG/ML
25 INJECTION INTRAMUSCULAR; INTRAVENOUS EVERY 6 HOURS PRN
Status: DISCONTINUED | OUTPATIENT
Start: 2023-10-08 | End: 2023-10-08 | Stop reason: HOSPADM

## 2023-10-08 RX ORDER — DEXAMETHASONE SODIUM PHOSPHATE 4 MG/ML
INJECTION, SOLUTION INTRA-ARTICULAR; INTRALESIONAL; INTRAMUSCULAR; INTRAVENOUS; SOFT TISSUE
Status: DISCONTINUED | OUTPATIENT
Start: 2023-10-08 | End: 2023-10-08

## 2023-10-08 RX ORDER — LIDOCAINE HYDROCHLORIDE 20 MG/ML
INJECTION INTRAVENOUS
Status: DISCONTINUED | OUTPATIENT
Start: 2023-10-08 | End: 2023-10-08

## 2023-10-08 RX ORDER — HYDROXYZINE PAMOATE 50 MG/1
50 CAPSULE ORAL ONCE
Status: COMPLETED | OUTPATIENT
Start: 2023-10-08 | End: 2023-10-08

## 2023-10-08 RX ORDER — ROCURONIUM BROMIDE 10 MG/ML
INJECTION, SOLUTION INTRAVENOUS
Status: DISCONTINUED | OUTPATIENT
Start: 2023-10-08 | End: 2023-10-08

## 2023-10-08 RX ORDER — DIPHENHYDRAMINE HYDROCHLORIDE 50 MG/ML
25 INJECTION INTRAMUSCULAR; INTRAVENOUS EVERY 4 HOURS PRN
Status: DISCONTINUED | OUTPATIENT
Start: 2023-10-08 | End: 2023-10-09 | Stop reason: HOSPADM

## 2023-10-08 RX ORDER — SCOLOPAMINE TRANSDERMAL SYSTEM 1 MG/1
1 PATCH, EXTENDED RELEASE TRANSDERMAL
Status: DISCONTINUED | OUTPATIENT
Start: 2023-10-08 | End: 2023-10-08 | Stop reason: HOSPADM

## 2023-10-08 RX ORDER — MIDAZOLAM HYDROCHLORIDE 1 MG/ML
2 INJECTION INTRAMUSCULAR; INTRAVENOUS
Status: DISCONTINUED | OUTPATIENT
Start: 2023-10-08 | End: 2023-10-08

## 2023-10-08 RX ORDER — DEXMEDETOMIDINE HYDROCHLORIDE 100 UG/ML
INJECTION, SOLUTION INTRAVENOUS
Status: DISCONTINUED | OUTPATIENT
Start: 2023-10-08 | End: 2023-10-08

## 2023-10-08 RX ORDER — FENTANYL CITRATE 50 UG/ML
50 INJECTION, SOLUTION INTRAMUSCULAR; INTRAVENOUS
Status: COMPLETED | OUTPATIENT
Start: 2023-10-08 | End: 2023-10-08

## 2023-10-08 RX ORDER — IBUPROFEN 800 MG/1
800 TABLET ORAL 3 TIMES DAILY
Status: DISCONTINUED | OUTPATIENT
Start: 2023-10-08 | End: 2023-10-09 | Stop reason: HOSPADM

## 2023-10-08 RX ORDER — SODIUM CHLORIDE, SODIUM GLUCONATE, SODIUM ACETATE, POTASSIUM CHLORIDE AND MAGNESIUM CHLORIDE 30; 37; 368; 526; 502 MG/100ML; MG/100ML; MG/100ML; MG/100ML; MG/100ML
INJECTION, SOLUTION INTRAVENOUS CONTINUOUS
Status: DISCONTINUED | OUTPATIENT
Start: 2023-10-08 | End: 2023-10-08

## 2023-10-08 RX ORDER — SODIUM CHLORIDE, SODIUM LACTATE, POTASSIUM CHLORIDE, CALCIUM CHLORIDE 600; 310; 30; 20 MG/100ML; MG/100ML; MG/100ML; MG/100ML
INJECTION, SOLUTION INTRAVENOUS CONTINUOUS
Status: DISCONTINUED | OUTPATIENT
Start: 2023-10-08 | End: 2023-10-09 | Stop reason: HOSPADM

## 2023-10-08 RX ORDER — HYDROMORPHONE HYDROCHLORIDE 2 MG/ML
1 INJECTION, SOLUTION INTRAMUSCULAR; INTRAVENOUS; SUBCUTANEOUS ONCE
Status: COMPLETED | OUTPATIENT
Start: 2023-10-08 | End: 2023-10-08

## 2023-10-08 RX ORDER — ONDANSETRON 2 MG/ML
4 INJECTION INTRAMUSCULAR; INTRAVENOUS
Status: COMPLETED | OUTPATIENT
Start: 2023-10-08 | End: 2023-10-08

## 2023-10-08 RX ORDER — METHOCARBAMOL 100 MG/ML
1000 INJECTION, SOLUTION INTRAMUSCULAR; INTRAVENOUS ONCE
Status: COMPLETED | OUTPATIENT
Start: 2023-10-08 | End: 2023-10-08

## 2023-10-08 RX ORDER — HYDROCODONE BITARTRATE AND ACETAMINOPHEN 5; 325 MG/1; MG/1
1 TABLET ORAL EVERY 4 HOURS PRN
Status: DISCONTINUED | OUTPATIENT
Start: 2023-10-08 | End: 2023-10-09

## 2023-10-08 RX ORDER — BUPIVACAINE HYDROCHLORIDE 5 MG/ML
INJECTION, SOLUTION EPIDURAL; INTRACAUDAL
Status: DISCONTINUED | OUTPATIENT
Start: 2023-10-08 | End: 2023-10-08 | Stop reason: HOSPADM

## 2023-10-08 RX ORDER — SUCCINYLCHOLINE CHLORIDE 20 MG/ML
INJECTION INTRAMUSCULAR; INTRAVENOUS
Status: DISCONTINUED | OUTPATIENT
Start: 2023-10-08 | End: 2023-10-08

## 2023-10-08 RX ORDER — FENTANYL CITRATE 50 UG/ML
INJECTION, SOLUTION INTRAMUSCULAR; INTRAVENOUS
Status: DISCONTINUED | OUTPATIENT
Start: 2023-10-08 | End: 2023-10-08

## 2023-10-08 RX ORDER — PROCHLORPERAZINE EDISYLATE 5 MG/ML
5 INJECTION INTRAMUSCULAR; INTRAVENOUS EVERY 30 MIN PRN
Status: DISCONTINUED | OUTPATIENT
Start: 2023-10-08 | End: 2023-10-08 | Stop reason: HOSPADM

## 2023-10-08 RX ORDER — PROPOFOL 10 MG/ML
VIAL (ML) INTRAVENOUS
Status: DISCONTINUED | OUTPATIENT
Start: 2023-10-08 | End: 2023-10-08

## 2023-10-08 RX ORDER — PROCHLORPERAZINE EDISYLATE 5 MG/ML
5 INJECTION INTRAMUSCULAR; INTRAVENOUS EVERY 6 HOURS PRN
Status: DISCONTINUED | OUTPATIENT
Start: 2023-10-08 | End: 2023-10-09 | Stop reason: HOSPADM

## 2023-10-08 RX ORDER — EPHEDRINE SULFATE 50 MG/ML
INJECTION, SOLUTION INTRAVENOUS
Status: DISCONTINUED | OUTPATIENT
Start: 2023-10-08 | End: 2023-10-08

## 2023-10-08 RX ORDER — CEFAZOLIN SODIUM 1 G/3ML
INJECTION, POWDER, FOR SOLUTION INTRAMUSCULAR; INTRAVENOUS
Status: DISCONTINUED | OUTPATIENT
Start: 2023-10-08 | End: 2023-10-08

## 2023-10-08 RX ORDER — ONDANSETRON 4 MG/1
8 TABLET, ORALLY DISINTEGRATING ORAL EVERY 8 HOURS PRN
Status: DISCONTINUED | OUTPATIENT
Start: 2023-10-08 | End: 2023-10-09 | Stop reason: HOSPADM

## 2023-10-08 RX ORDER — DIPHENHYDRAMINE HCL 25 MG
25 CAPSULE ORAL EVERY 4 HOURS PRN
Status: DISCONTINUED | OUTPATIENT
Start: 2023-10-08 | End: 2023-10-09 | Stop reason: HOSPADM

## 2023-10-08 RX ORDER — ONDANSETRON 2 MG/ML
INJECTION INTRAMUSCULAR; INTRAVENOUS
Status: DISCONTINUED | OUTPATIENT
Start: 2023-10-08 | End: 2023-10-08

## 2023-10-08 RX ORDER — PHENYLEPHRINE HYDROCHLORIDE 10 MG/ML
INJECTION INTRAVENOUS
Status: DISCONTINUED | OUTPATIENT
Start: 2023-10-08 | End: 2023-10-08

## 2023-10-08 RX ORDER — HYDROCODONE BITARTRATE AND ACETAMINOPHEN 10; 325 MG/1; MG/1
1 TABLET ORAL EVERY 4 HOURS PRN
Status: DISCONTINUED | OUTPATIENT
Start: 2023-10-08 | End: 2023-10-09

## 2023-10-08 RX ORDER — MORPHINE SULFATE 4 MG/ML
4 INJECTION, SOLUTION INTRAMUSCULAR; INTRAVENOUS
Status: COMPLETED | OUTPATIENT
Start: 2023-10-08 | End: 2023-10-08

## 2023-10-08 RX ORDER — LORAZEPAM 2 MG/ML
1 INJECTION INTRAMUSCULAR
Status: COMPLETED | OUTPATIENT
Start: 2023-10-08 | End: 2023-10-08

## 2023-10-08 RX ORDER — KETOROLAC TROMETHAMINE 30 MG/ML
INJECTION, SOLUTION INTRAMUSCULAR; INTRAVENOUS
Status: DISCONTINUED | OUTPATIENT
Start: 2023-10-08 | End: 2023-10-08

## 2023-10-08 RX ORDER — ACETAMINOPHEN 10 MG/ML
1000 INJECTION, SOLUTION INTRAVENOUS ONCE
Status: DISCONTINUED | OUTPATIENT
Start: 2023-10-08 | End: 2023-10-08

## 2023-10-08 RX ORDER — HYDROMORPHONE HYDROCHLORIDE 2 MG/ML
0.5 INJECTION, SOLUTION INTRAMUSCULAR; INTRAVENOUS; SUBCUTANEOUS EVERY 5 MIN PRN
Status: DISCONTINUED | OUTPATIENT
Start: 2023-10-08 | End: 2023-10-08 | Stop reason: HOSPADM

## 2023-10-08 RX ORDER — ACETAMINOPHEN 10 MG/ML
INJECTION, SOLUTION INTRAVENOUS
Status: DISCONTINUED | OUTPATIENT
Start: 2023-10-08 | End: 2023-10-08

## 2023-10-08 RX ORDER — ONDANSETRON 2 MG/ML
4 INJECTION INTRAMUSCULAR; INTRAVENOUS DAILY PRN
Status: DISCONTINUED | OUTPATIENT
Start: 2023-10-08 | End: 2023-10-08 | Stop reason: HOSPADM

## 2023-10-08 RX ORDER — LIDOCAINE HYDROCHLORIDE 10 MG/ML
1 INJECTION, SOLUTION EPIDURAL; INFILTRATION; INTRACAUDAL; PERINEURAL ONCE
Status: DISCONTINUED | OUTPATIENT
Start: 2023-10-08 | End: 2023-10-08 | Stop reason: HOSPADM

## 2023-10-08 RX ORDER — HYDROMORPHONE HYDROCHLORIDE 2 MG/ML
0.2 INJECTION, SOLUTION INTRAMUSCULAR; INTRAVENOUS; SUBCUTANEOUS EVERY 5 MIN PRN
Status: DISCONTINUED | OUTPATIENT
Start: 2023-10-08 | End: 2023-10-08 | Stop reason: HOSPADM

## 2023-10-08 RX ORDER — MIDAZOLAM HYDROCHLORIDE 1 MG/ML
INJECTION INTRAMUSCULAR; INTRAVENOUS
Status: DISCONTINUED | OUTPATIENT
Start: 2023-10-08 | End: 2023-10-08

## 2023-10-08 RX ADMIN — LORAZEPAM 2 MG: 2 INJECTION INTRAMUSCULAR; INTRAVENOUS at 10:10

## 2023-10-08 RX ADMIN — HYDROCODONE BITARTRATE AND ACETAMINOPHEN 1 TABLET: 10; 325 TABLET ORAL at 03:10

## 2023-10-08 RX ADMIN — HYDROMORPHONE HYDROCHLORIDE 0.5 MG: 2 INJECTION INTRAMUSCULAR; INTRAVENOUS; SUBCUTANEOUS at 01:10

## 2023-10-08 RX ADMIN — SUGAMMADEX 200 MG: 100 INJECTION, SOLUTION INTRAVENOUS at 01:10

## 2023-10-08 RX ADMIN — ROCURONIUM BROMIDE 5 MG: 10 SOLUTION INTRAVENOUS at 12:10

## 2023-10-08 RX ADMIN — DEXMEDETOMIDINE 2 MCG: 200 INJECTION, SOLUTION INTRAVENOUS at 12:10

## 2023-10-08 RX ADMIN — EPHEDRINE SULFATE 5 MG: 50 INJECTION INTRAVENOUS at 12:10

## 2023-10-08 RX ADMIN — SUCCINYLCHOLINE CHLORIDE 100 MG: 20 INJECTION, SOLUTION INTRAMUSCULAR; INTRAVENOUS at 12:10

## 2023-10-08 RX ADMIN — ROCURONIUM BROMIDE 45 MG: 10 SOLUTION INTRAVENOUS at 12:10

## 2023-10-08 RX ADMIN — SCOPOLAMINE 1 PATCH: 1 PATCH TRANSDERMAL at 11:10

## 2023-10-08 RX ADMIN — KETOROLAC TROMETHAMINE 30 MG: 30 INJECTION, SOLUTION INTRAMUSCULAR; INTRAVENOUS at 01:10

## 2023-10-08 RX ADMIN — PHENYLEPHRINE HYDROCHLORIDE 100 MCG: 10 INJECTION INTRAVENOUS at 12:10

## 2023-10-08 RX ADMIN — SODIUM CHLORIDE, POTASSIUM CHLORIDE, SODIUM LACTATE AND CALCIUM CHLORIDE 2000 ML: 600; 310; 30; 20 INJECTION, SOLUTION INTRAVENOUS at 09:10

## 2023-10-08 RX ADMIN — HYDROMORPHONE HYDROCHLORIDE 0.5 MG: 2 INJECTION INTRAMUSCULAR; INTRAVENOUS; SUBCUTANEOUS at 02:10

## 2023-10-08 RX ADMIN — ACETAMINOPHEN 1000 MG: 10 INJECTION, SOLUTION INTRAVENOUS at 11:10

## 2023-10-08 RX ADMIN — MEPERIDINE HYDROCHLORIDE 12.5 MG: 25 INJECTION INTRAMUSCULAR; INTRAVENOUS; SUBCUTANEOUS at 01:10

## 2023-10-08 RX ADMIN — IBUPROFEN 800 MG: 800 TABLET, FILM COATED ORAL at 06:10

## 2023-10-08 RX ADMIN — PROPOFOL 150 MG: 10 INJECTION, EMULSION INTRAVENOUS at 12:10

## 2023-10-08 RX ADMIN — MIDAZOLAM HYDROCHLORIDE 2 MG: 1 INJECTION, SOLUTION INTRAMUSCULAR; INTRAVENOUS at 11:10

## 2023-10-08 RX ADMIN — HYDROCODONE BITARTRATE AND ACETAMINOPHEN 1 TABLET: 10; 325 TABLET ORAL at 07:10

## 2023-10-08 RX ADMIN — ONDANSETRON 4 MG: 2 INJECTION INTRAMUSCULAR; INTRAVENOUS at 09:10

## 2023-10-08 RX ADMIN — LIDOCAINE HYDROCHLORIDE 80 MG: 20 INJECTION INTRAVENOUS at 12:10

## 2023-10-08 RX ADMIN — SODIUM CHLORIDE, POTASSIUM CHLORIDE, SODIUM LACTATE AND CALCIUM CHLORIDE: 600; 310; 30; 20 INJECTION, SOLUTION INTRAVENOUS at 03:10

## 2023-10-08 RX ADMIN — DEXMEDETOMIDINE 2 MCG: 200 INJECTION, SOLUTION INTRAVENOUS at 01:10

## 2023-10-08 RX ADMIN — HYDROXYZINE PAMOATE 50 MG: 50 CAPSULE ORAL at 08:10

## 2023-10-08 RX ADMIN — ONDANSETRON 4 MG: 2 INJECTION INTRAMUSCULAR; INTRAVENOUS at 12:10

## 2023-10-08 RX ADMIN — DEXMEDETOMIDINE 5 MCG: 200 INJECTION, SOLUTION INTRAVENOUS at 01:10

## 2023-10-08 RX ADMIN — SODIUM CHLORIDE, SODIUM GLUCONATE, SODIUM ACETATE, POTASSIUM CHLORIDE AND MAGNESIUM CHLORIDE: 526; 502; 368; 37; 30 INJECTION, SOLUTION INTRAVENOUS at 11:10

## 2023-10-08 RX ADMIN — MORPHINE SULFATE 4 MG: 4 INJECTION INTRAVENOUS at 09:10

## 2023-10-08 RX ADMIN — SODIUM CHLORIDE, SODIUM GLUCONATE, SODIUM ACETATE, POTASSIUM CHLORIDE AND MAGNESIUM CHLORIDE: 526; 502; 368; 37; 30 INJECTION, SOLUTION INTRAVENOUS at 01:10

## 2023-10-08 RX ADMIN — METHOCARBAMOL 1000 MG: 100 INJECTION INTRAMUSCULAR; INTRAVENOUS at 01:10

## 2023-10-08 RX ADMIN — HYDROMORPHONE HYDROCHLORIDE 1 MG: 2 INJECTION INTRAMUSCULAR; INTRAVENOUS; SUBCUTANEOUS at 11:10

## 2023-10-08 RX ADMIN — CEFAZOLIN 2 G: 330 INJECTION, POWDER, FOR SOLUTION INTRAMUSCULAR; INTRAVENOUS at 12:10

## 2023-10-08 RX ADMIN — FENTANYL CITRATE 100 MCG: 50 INJECTION, SOLUTION INTRAMUSCULAR; INTRAVENOUS at 12:10

## 2023-10-08 RX ADMIN — FENTANYL CITRATE 50 MCG: 50 INJECTION, SOLUTION INTRAMUSCULAR; INTRAVENOUS at 10:10

## 2023-10-08 RX ADMIN — EPHEDRINE SULFATE 10 MG: 50 INJECTION INTRAVENOUS at 01:10

## 2023-10-08 RX ADMIN — LORAZEPAM 1 MG: 2 INJECTION INTRAMUSCULAR; INTRAVENOUS at 09:10

## 2023-10-08 RX ADMIN — DEXAMETHASONE SODIUM PHOSPHATE 8 MG: 4 INJECTION, SOLUTION INTRA-ARTICULAR; INTRALESIONAL; INTRAMUSCULAR; INTRAVENOUS; SOFT TISSUE at 12:10

## 2023-10-08 NOTE — ED PROVIDER NOTES
Encounter Date: 10/8/2023    SCRIBE #1 NOTE: I, Kylie Vance, am scribing for, and in the presence of,  Cecy Wilson MD. I have scribed the following portions of the note - Other sections scribed: HPI, ROS, PE.       History     Chief Complaint   Patient presents with    Abdominal Pain     Aasi reports pt is having a miscarriage and began having right lower quadrant abd pain at 0830. Roughly 7 weeks pregnant. . Pt screaming in pain in triage     28 year old female with a pmhx of hyperthyroidism and ovarian cyst reports to the ED following abdominal pain onset this morning.  The patient reports being pregnant and having a miscarriage approximately 1 week ago.  The patient began having pain in the RLQ this morning.  The patient reports associated symptoms of vaginal bleeding.    A full HPI could not be completed due to the acuity of the patient's condition.    The history is provided by the patient. The history is limited by the condition of the patient. No  was used.     Review of patient's allergies indicates:  No Known Allergies  Past Medical History:   Diagnosis Date    ADHD     Anxiety disorder, unspecified     Hyperthyroidism     Ovarian cyst      History reviewed. No pertinent surgical history.  Family History   Problem Relation Age of Onset    Hypothyroidism Mother     No Known Problems Father     Hypothyroidism Sister     Diabetes Maternal Grandmother      Social History     Tobacco Use    Smoking status: Former     Current packs/day: 0.00     Types: Cigarettes     Quit date:      Years since quittin.7    Smokeless tobacco: Current   Substance Use Topics    Alcohol use: Yes     Alcohol/week: 1.0 standard drink of alcohol     Types: 1 Shots of liquor per week    Drug use: Yes     Frequency: 7.0 times per week     Types: Marijuana     Review of Systems   Unable to perform ROS: Acuity of condition   Gastrointestinal:  Positive for abdominal pain.   Genitourinary:   Positive for vaginal bleeding.       Physical Exam     Initial Vitals [10/08/23 0856]   BP Pulse Resp Temp SpO2   (!) 145/90 102 (!) 22 98.1 °F (36.7 °C) 100 %      MAP       --         Physical Exam    Nursing note and vitals reviewed.  Constitutional: She appears well-developed and well-nourished. She appears distressed.   HENT:   Head: Normocephalic and atraumatic.   Nose: Nose normal.   Mouth/Throat: Oropharynx is clear and moist.   Eyes: Conjunctivae and EOM are normal. Pupils are equal, round, and reactive to light.   Neck: Trachea normal. Neck supple.   Normal range of motion.  Cardiovascular:  Normal rate, regular rhythm, normal heart sounds and intact distal pulses.           No murmur heard.  Pulmonary/Chest: Breath sounds normal. No respiratory distress. She has no wheezes. She has no rhonchi. She has no rales. She exhibits no tenderness.   Abdominal: There is generalized abdominal tenderness. There is guarding.   Genitourinary:    Genitourinary Comments: Pt refused     Musculoskeletal:         General: No tenderness or edema. Normal range of motion.      Cervical back: Normal range of motion and neck supple.      Lumbar back: Normal. Normal range of motion.     Neurological: She is oriented to person, place, and time. She has normal strength. No cranial nerve deficit or sensory deficit.   Skin: Skin is warm and dry. Capillary refill takes less than 2 seconds. No abscess noted. No erythema. No pallor.   Psychiatric:   Patient is tearful         ED Course   Critical Care    Date/Time: 10/8/2023 11:01 AM    Performed by: Cecy Wilson MD  Authorized by: Cecy Wilson MD  Direct patient critical care time: 30 minutes  Total critical care time (exclusive of procedural time) : 30 minutes  Critical care was necessary to treat or prevent imminent or life-threatening deterioration of the following conditions: dehydration and circulatory failure.  Critical care was time spent personally by me on the  following activities: development of treatment plan with patient or surrogate, discussions with consultants, evaluation of patient's response to treatment, examination of patient, obtaining history from patient or surrogate, ordering and review of laboratory studies, ordering and performing treatments and interventions, ordering and review of radiographic studies, pulse oximetry, re-evaluation of patient's condition and review of old charts.        Labs Reviewed   COMPREHENSIVE METABOLIC PANEL - Abnormal; Notable for the following components:       Result Value    Chloride 112 (*)     Carbon Dioxide 16 (*)     Glucose Level 101 (*)     All other components within normal limits   PROTIME-INR - Abnormal; Notable for the following components:    PT 12.0 (*)     All other components within normal limits   CBC WITH DIFFERENTIAL - Abnormal; Notable for the following components:    WBC 12.51 (*)     MCH 32.6 (*)     Platelet 439 (*)     Lymph # 5.29 (*)     All other components within normal limits   HCG, QUANTITATIVE - Abnormal; Notable for the following components:    Beta Human Chorionic Gonadotropin Quantitative 829.05 (*)     All other components within normal limits   APTT - Normal   CBC W/ AUTO DIFFERENTIAL    Narrative:     The following orders were created for panel order CBC auto differential.  Procedure                               Abnormality         Status                     ---------                               -----------         ------                     CBC with Differential[[id number]]       Abnormal            Final result                 Please view results for these tests on the individual orders.   TYPE & SCREEN          Imaging Results              US OB <14 Wks, TransAbd, Single Gestation (Final result)  Result time 10/08/23 10:18:55      Final result by Paras Reza MD (10/08/23 10:18:55)                   Impression:      Persistent right adnexal masslike echotexture which could not be  better assessed as the patient had to stop the exam due to extreme pain and also refused transvaginal technique.  Please correlate clinically.  Follow-up exams are recommended.    Interval size increase of fluid cul-de-sac.      Electronically signed by: Paras Reza  Date:    10/08/2023  Time:    10:18               Narrative:    EXAMINATION:  US OB <14 WEEKS TRANSABDOM, SINGLE GESTATION    CLINICAL HISTORY:  abd pain;    TECHNIQUE:  Multiple real-time images were performed pelvis in various planes by the sonographer.    COMPARISON:  2023.    FINDINGS:  Uterus measures 7.7 x 4.6 x 5.2 cm.  There is irregular hypoechoic structure as seen on previous exam and is favored to represent are fibroid and less likely gestation sac measuring 1.36 x 1.02 x 1.23 cm.  Patient due to extreme pain was not able to cooperate for the exam.  Patient stopped the exam due to the pain.  Patient also refused transvaginal study.    Right ovary is not visualized.  There is again visualization right adnexal mass measuring 8.58 x 6.68 x 5.55 cm.  This could not be better assessed to look for yolk sac or fetal pole due to above discussed reasons.    Left ovary measures 3.0 x 2.2 x 1.8 cm.  Left ovary could not be visualized due to extensive fluid and bowel gas.    There is interval size increase of fluid in the cul-de-sac since the previous exam.                                       Medications   morphine injection 4 mg (4 mg Intravenous Given 10/8/23 0909)   ondansetron injection 4 mg (4 mg Intravenous Given 10/8/23 0909)   lactated ringers bolus 2,000 mL (2,000 mLs Intravenous New Bag 10/8/23 0910)   LORazepam injection 1 mg (1 mg Intravenous Given 10/8/23 0928)   fentaNYL injection 50 mcg (50 mcg Intravenous Given 10/8/23 1000)     Medical Decision Making  The differential diagnoses includes but is not limited to: spontaneous , ruptured ectopic pregnancy, incomplete , torsion  Cbc, cmp, coags, type and  screen, quant hcg, abd us ordered and reviewed  Severe pain, diffusely tender, bp stable but tachycardic, given multiple rounds of iv analgesia and iv ativan  Increasing size of free fluid, significant concern for ruptured ectopic given presentation  Had pregnancy of undetermined location previously, has had downtrending hcg, reports she feels like something burst or ripped open  Discussed with obgyn who will take patient to or   Labs with leukocytosis, likely demargination due to stress, h/h ok, a + and does not need rhogam, chemistry with mild metabolic acidosis likely due to ruptured ectopic and hcg is downtrending      Problems Addressed:  Acute right lower quadrant pain: acute illness or injury that poses a threat to life or bodily functions  Leukocytosis, unspecified type: acute illness or injury  Ruptured ectopic pregnancy: acute illness or injury that poses a threat to life or bodily functions    Amount and/or Complexity of Data Reviewed  External Data Reviewed: labs, radiology and notes.  Labs: ordered. Decision-making details documented in ED Course.  Radiology: ordered and independent interpretation performed. Decision-making details documented in ED Course.     Details: Increased free fluid, concern for ruptured ectopic    Risk  OTC drugs.  Prescription drug management.  Parenteral controlled substances.  Decision regarding hospitalization.  Emergency major surgery.    Critical Care  Total time providing critical care: 30 minutes            Scribe Attestation:   Scribe #1: I performed the above scribed service and the documentation accurately describes the services I performed. I attest to the accuracy of the note.    Attending Attestation:           Physician Attestation for Scribe:  Physician Attestation Statement for Scribe #1: I, Cecy Wilson MD, reviewed documentation, as scribed by Kylie Vance in my presence, and it is both accurate and complete.             ED Course as of 10/08/23  1104   Sun Oct 08, 2023   0904 Fifth visit for this, abdominal exam with much more tenderness than my previous exam, had labs but has yet to have a follow up, on ultrasound there was pregnancy of undetermined location [BS]   0956 Radiology tech has performed multiple us on this patient, reports significant increase in fluid, will bring over to radiology to read, pt asking for additional pain meds, fentanyl ordered. Remains stable at this time. Will discuss with obgyn [BS]   0958 Pelvic deferred as pt refused [BS]   1006 Paged OB hospitalist [MB]   1025 Discussed with dr zuñiga my concern regarding possible ruptured ectopic, severe pain, increased free fluid in abdomen and right adnexal mass. Wants to call out or [BS]      ED Course User Index  [BS] Cecy Wilson MD  [MB] Kylie Vance                    Clinical Impression:   Final diagnoses:  [O00.90] Ruptured ectopic pregnancy  [R10.31] Acute right lower quadrant pain (Primary)  [D72.829] Leukocytosis, unspecified type        ED Disposition Condition    Admit Stable                Cecy Wilson MD  10/08/23 1107

## 2023-10-08 NOTE — ANESTHESIA PROCEDURE NOTES
Intubation    Date/Time: 10/8/2023 12:02 PM    Performed by: Nicole Bang  Authorized by: Max Washington MD    Intubation:     Induction:  Rapid sequence induction    Intubated:  Postinduction    Mask Ventilation:  Not attempted    Attempts:  1    Attempted By:  Student    Method of Intubation:  Direct    Blade:  Solares 2    Laryngeal View Grade: Grade IIA - cords partially seen      Difficult Airway Encountered?: No      Complications:  None    Airway Device:  Oral endotracheal tube    Airway Device Size:  7.0    Style/Cuff Inflation:  Cuffed    Inflation Amount (mL):  5    Tube secured:  21    Secured at:  The lips    Placement Verified By:  Capnometry    Complicating Factors:  None    Findings Post-Intubation:  BS equal bilateral and atraumatic/condition of teeth unchanged

## 2023-10-08 NOTE — ASSESSMENT & PLAN NOTE
Pt consented for laparoscopy with possible salpingectomy, possible oophorectomy.  All questions answered. Pt desires to proceed with surgery.

## 2023-10-08 NOTE — HPI
28 yr  who has been to ER on several occasions for abd pain.  Today, pt is having severe RLQ pain.  Transab sono show much more free fluid in abd as compared with prior sonos.  Pt also noted to have R adnexal mass.  No IUP has ever been seen.  HCG today has decreased from 3182 to 829.  Concern is for ruptured ectopic pregnancy.

## 2023-10-08 NOTE — OP NOTE
Surgery Date: 10/8/2023      Surgeon(s) and Role:     * Stanley Camacho MD - Primary     Assisting Surgeon: None     Pre-op Diagnosis:  Ruptured ectopic pregnancy [O00.90]     Post-op Diagnosis:  Post-Op Diagnosis Codes:     * Ruptured ectopic pregnancy [O00.90]     Procedure(s) (LRB):  SALPINGO-OOPHORECTOMY, LAPAROSCOPIC (Right)     Anesthesia: General     Implants:  * No implants in log *     Operative Findings: significant hemoperitoneum with 350 cc blood evacuated, L hydrosalpinx, Ruptured R fallopian tube     Estimated Blood Loss: 600 cc (approx)     Estimated Blood Loss has been documented.         Specimens: R fallopian tube    Procedure in Detail:  After informed consent was obtained the patient was taken to the operating room administered general endotracheal anesthesia without difficulty.  Patient was prepped and draped normal sterile fashion in the dorsal lithotomy position.  Badillo catheter was placed.  Attention was then turned to the abdomen in which her infraumbilical skin incision was made proximally 5 mm in diameter.  The Veress needle was inserted and the abdomen insufflated 1st with low-flow then with high-flow CO2 gas once there was no evidence of obstruction.  Upon adequate insufflation a 5 mm trocar was placed using the laparoscopic for assistance.  The trocar blade was then removed and the laparoscopic reinserted confirming intra-abdominal placement without evidence of intra-abdominal trauma.  On survey of the abdomen and pelvis there were significant amount of adhesions as well as hemoperitoneum noted.  A 10 mm incision was made suprapubically and a 10 mm trocar placed under direct visualization.  A 5 mm incision was made near the right anterior superior iliac crest and placed under direct visualization.  The a significant portion of the hemoperitoneum was evacuated using the suction .  The left fallopian tube was noted to be a large hydrosalpinx.  A large fundal posterior fibroid  was noted on the uterus itself.  And the ectopic pregnancy was identified within the right fallopian tube.  There were significant omental adhesions which required resection before the tube could be fully identified.  A salpingectomy was performed using the EnSeal until the right fallopian tube was fully resected.  The fallopian tube was then placed in an Endo-Catch bag through the 10 mm trocar.  The fallopian tube was too large to come through the 10 mm incision and the trocar incision required extension bilaterally to allow the evacuation of the Endo-Catch and its contents.  The laparoscopic was then reinserted and excellent hemostasis along the surgical margins was noted however not all of the hemoperitoneum could be fully evacuated.  The fascia at the 10 mm trocar was closed using 0 Vicryl in a running fashion.  The skin of the 10 mm trocar was closed using 4 simple interrupted 4-0 Vicryl sutures.  The 5 mm trocars were then removed and each 5 mm trocar site was closed with a single simple interrupted 4-0 Vicryl suture.  Sponge, lap, instrument counts were correct x2.  There were no noted intraoperative complications.  Patient was taken to recovery awake and stable condition.

## 2023-10-08 NOTE — H&P
[facility] - Emergency Dept  Obstetrics  History & Physical    Patient Name: Sushma Tolentino  MRN: [id number]  Admission Date: 10/8/2023  Primary Care Provider: Mckenna, Primary Doctor    Subjective:     Principal Problem:Ruptured ectopic pregnancy    History of Present Illness:  28 yr  who has been to ER on several occasions for abd pain.  Today, pt is having severe RLQ pain.  Transab sono show much more free fluid in abd as compared with prior sonos.  Pt also noted to have R adnexal mass.  No IUP has ever been seen.  HCG today has decreased from 3182 to 829.  Concern is for ruptured ectopic pregnancy.      Obstetric HPI:  Concern for ruptured ectopic pregnancy      OB History    Para Term  AB Living   2 0 0 0 1 0   SAB IAB Ectopic Multiple Live Births   1 0 0 0 0      # Outcome Date GA Lbr Barry/2nd Weight Sex Delivery Anes PTL Lv   2 Current            1 2015 6w0d    SAB        Past Medical History:   Diagnosis Date    ADHD     Anxiety disorder, unspecified     Hyperthyroidism     Ovarian cyst      History reviewed. No pertinent surgical history.    (Not in a hospital admission)      Review of patient's allergies indicates:  No Known Allergies     Family History       Problem Relation (Age of Onset)    Diabetes Maternal Grandmother    Hypothyroidism Mother, Sister    No Known Problems Father          Tobacco Use    Smoking status: Former     Current packs/day: 0.00     Types: Cigarettes     Quit date: 2017     Years since quittin.7    Smokeless tobacco: Current   Substance and Sexual Activity    Alcohol use: Yes     Alcohol/week: 1.0 standard drink of alcohol     Types: 1 Shots of liquor per week    Drug use: Yes     Frequency: 7.0 times per week     Types: Marijuana    Sexual activity: Yes     Partners: Male     Review of Systems   Constitutional: Negative.    Respiratory: Negative.     Cardiovascular: Negative.    Gastrointestinal: Negative.    Endocrine:  Negative.    Genitourinary: Negative.    Musculoskeletal: Negative.    Neurological: Negative.    Psychiatric/Behavioral:  Positive for depression. The patient is nervous/anxious.    Breast: negative.       Objective:     Vital Signs (Most Recent):  Temp: 98.1 °F (36.7 °C) (10/08/23 0856)  Pulse: 68 (10/08/23 1001)  Resp: 18 (10/08/23 1000)  BP: 108/87 (10/08/23 1001)  SpO2: 99 % (10/08/23 1001) Vital Signs (24h Range):  Temp:  [98.1 °F (36.7 °C)] 98.1 °F (36.7 °C)  Pulse:  [] 68  Resp:  [18-22] 18  SpO2:  [98 %-100 %] 99 %  BP: (108-145)/() 108/87     Weight: 56.7 kg (125 lb)  Body mass index is 20.8 kg/m².         Physical Exam:   Constitutional: She is oriented to person, place, and time. She appears well-developed and well-nourished. She appears distressed.    HENT:   Head: Normocephalic and atraumatic.     Neck: No thyromegaly present.          Abdominal: She exhibits no mass. There is abdominal tenderness. There is rebound and guarding. No hernia.             Musculoskeletal: Normal range of motion and moves all extremeties. No tenderness.       Neurological: She is alert and oriented to person, place, and time. No cranial nerve deficit. Coordination normal.    Skin: She is diaphoretic.    Psychiatric: She has a normal mood and affect. Her behavior is normal. Judgment and thought content normal.          Significant Labs:  Lab Results   Component Value Date    GROUPTRH A POS 10/08/2023       CBC:   Recent Labs   Lab 10/08/23  0909   WBC 12.51*   RBC 4.30   HGB 14.0   HCT 39.9   *   MCV 92.8   MCH 32.6*   MCHC 35.1     I have personallly reviewed all pertinent lab results from the last 24 hours.    Assessment/Plan:     28 y.o. female  at Unknown for:    * Ruptured ectopic pregnancy  Pt consented for laparoscopy with possible salpingectomy, possible oophorectomy.  All questions answered. Pt desires to proceed with surgery.    Abnormal pregnancy in first trimester  Pt consented for  laparoscopy with possible salpingectomy, possible oophorectomy.  All questions answered. Pt desires to proceed with surgery.    Abdominal pain complicating pregnancy  Pt consented for laparoscopy with possible salpingectomy, possible oophorectomy.  All questions answered. Pt desires to proceed with surgery.        Stanley Camacho MD  Obstetrics  [facility] - Emergency Dept

## 2023-10-08 NOTE — ANESTHESIA PREPROCEDURE EVALUATION
10/08/2023  Sushma Tolentino is a 28 y.o., female.      Sushma Tolentino    Pre-op Diagnosis: Ruptured ectopic pregnancy [O00.90]    Procedure(s):  SALPINGO-OOPHORECTOMY, LAPAROSCOPIC     Review of patient's allergies indicates:  No Known Allergies    Current Outpatient Medications   Medication Instructions    acetaminophen (TYLENOL) 650 mg, Oral, Every 8 hours PRN    buPROPion (WELLBUTRIN XL) 150 mg, Oral, Every morning    dextroamphetamine-amphetamine (ADDERALL) 20 mg tablet 1 tablet, Oral, 3 times daily       Past Medical History:   Diagnosis Date    ADHD     Anxiety disorder, unspecified     Hyperthyroidism     Ovarian cyst        History reviewed. No pertinent surgical history.     Recent Labs   Lab 10/08/23  0909   WBC 12.51*   RBC 4.30   HGB 14.0   HCT 39.9   MCV 92.8   MCH 32.6*   MCHC 35.1   RDW 12.5   *   MPV 9.5       Recent Labs   Lab 10/08/23  0909      K 4.9   CO2 16*   BUN 9.7   CREATININE 0.68   CALCIUM 9.5   ALBUMIN 4.6   ALKPHOS 50   ALT 12   AST 16   BILITOT 0.4       Recent Labs   Lab 10/08/23  0909   PTT 27.0   INR 0.9     ABDOMINAL ULTRASOUND  FINDINGS:  Uterus measures 7.7 x 4.6 x 5.2 cm.  There is irregular hypoechoic structure as seen on previous exam and is favored to represent are fibroid and less likely gestation sac measuring 1.36 x 1.02 x 1.23 cm.  Patient due to extreme pain was not able to cooperate for the exam.  Patient stopped the exam due to the pain.  Patient also refused transvaginal study.     Right ovary is not visualized.  There is again visualization right adnexal mass measuring 8.58 x 6.68 x 5.55 cm.  This could not be better assessed to look for yolk sac or fetal pole due to above discussed reasons.     Left ovary measures 3.0 x 2.2 x 1.8 cm.  Left ovary could not be visualized due to extensive fluid and bowel gas.     There is interval size  increase of fluid in the cul-de-sac since the previous exam.     Impression:     Persistent right adnexal masslike echotexture which could not be better assessed as the patient had to stop the exam due to extreme pain and also refused transvaginal technique.  Please correlate clinically.  Follow-up exams are recommended.     Interval size increase of fluid cul-de-sac.      Electronically signed by: Paras Reza      10/08/2023      10:18    Pre-op Assessment    I have reviewed the Patient Summary Reports.    I have reviewed the NPO Status.   I have reviewed the Medications.     Review of Systems  Anesthesia Hx:  No problems with previous Anesthesia  Denies Family Hx of Anesthesia complications.   Denies Personal Hx of Anesthesia complications.   Social:  Non-Smoker    Cardiovascular:   Exercise tolerance: good  Denies Angina.  Denies Orthopnea.  Denies PND.  Denies IVNSON.  Functional Capacity good / => 4 METS    Musculoskeletal:  Musculoskeletal Normal    Neurological:   Denies TIA. Denies CVA.    Endocrine:  Thyroid Disease, Hyperthyroidism    Psych:   Psychiatric History (ADHD) anxiety          Physical Exam  General: Well nourished, Alert, Oriented and Anxious  In severe pain  Airway:  Mallampati: III   Mouth Opening: Normal  TM Distance: Normal  Tongue: Normal  Neck ROM: Normal ROM    Dental:  Intact    Chest/Lungs:  Clear to auscultation    Heart:  Rate: Normal  Rhythm: Regular Rhythm  No pretibial edema  No carotid bruits      Anesthesia Plan  Type of Anesthesia, risks & benefits discussed:    Anesthesia Type: Gen ETT  Intra-op Monitoring Plan: Standard ASA Monitors  Post Op Pain Control Plan: multimodal analgesia  Induction:  IV and rapid sequence  Airway Plan: Direct, Post-Induction  Informed Consent: Informed consent signed with the Patient and all parties understand the risks and agree with anesthesia plan.  All questions answered. Patient consented to blood products? Yes  ASA Score: 2 Emergent  Day of  Surgery Review of History & Physical: H&P Update referred to the surgeon/provider.    Ready For Surgery From Anesthesia Perspective.     .

## 2023-10-08 NOTE — TRANSFER OF CARE
"Anesthesia Transfer of Care Note    Patient: Sushma Tolentino    Procedure(s) Performed: Procedure(s) (LRB):  SALPINGO-OOPHORECTOMY, LAPAROSCOPIC (Right)    Patient location: PACU    Anesthesia Type: general    Transport from OR: Transported from OR on room air with adequate spontaneous ventilation    Post pain: adequate analgesia    Post assessment: no apparent anesthetic complications and tolerated procedure well    Post vital signs: stable    Level of consciousness: awake and alert    Nausea/Vomiting: no nausea/vomiting    Complications: none    Transfer of care protocol was followed      Last vitals:   Visit Vitals  /86   Pulse 85   Temp 36.7 °C (98.1 °F) (Oral)   Resp 15   Ht 5' 5" (1.651 m)   Wt 56.7 kg (125 lb)   LMP 08/27/2023 (Within Days)   SpO2 99%   BMI 20.80 kg/m²     "

## 2023-10-08 NOTE — HOSPITAL COURSE
OB hospitalist consulted from ER due to concern for ruptured ectopic.  Pt consented for laparoscopy with possible salpingectomy, possible oophorectomy.  Pt understands risks, and there is no medical alternative to surgery at this time.    Past Medical History:   Diagnosis Date    ADHD     Anxiety disorder, unspecified     Hyperthyroidism     Ovarian cyst      History reviewed. No pertinent surgical history.    Review of patient's allergies indicates:  No Known Allergies    Social History     Tobacco Use    Smoking status: Former     Current packs/day: 0.00     Types: Cigarettes     Quit date:      Years since quittin.7    Smokeless tobacco: Current   Substance Use Topics    Alcohol use: Yes     Alcohol/week: 1.0 standard drink of alcohol     Types: 1 Shots of liquor per week    Drug use: Yes     Frequency: 7.0 times per week     Types: Marijuana     Family History   Problem Relation Age of Onset    Hypothyroidism Mother     No Known Problems Father     Hypothyroidism Sister     Diabetes Maternal Grandmother      Patient's Medications   New Prescriptions    No medications on file   Previous Medications    ACETAMINOPHEN (TYLENOL) 325 MG TABLET    Take 2 tablets (650 mg total) by mouth every 8 (eight) hours as needed.    BUPROPION (WELLBUTRIN XL) 150 MG TB24 TABLET    Take 150 mg by mouth every morning.    DEXTROAMPHETAMINE-AMPHETAMINE (ADDERALL) 20 MG TABLET    Take 1 tablet by mouth 3 (three) times daily.   Modified Medications    No medications on file   Discontinued Medications    No medications on file

## 2023-10-08 NOTE — BRIEF OP NOTE
[facility] - Periop Services  Brief Operative Note    SUMMARY     Surgery Date: 10/8/2023     Surgeon(s) and Role:     * Stanley Camacho MD - Primary    Assisting Surgeon: None    Pre-op Diagnosis:  Ruptured ectopic pregnancy [O00.90]    Post-op Diagnosis:  Post-Op Diagnosis Codes:     * Ruptured ectopic pregnancy [O00.90]    Procedure(s) (LRB):  SALPINGO-OOPHORECTOMY, LAPAROSCOPIC (Right)    Anesthesia: General    Implants:  * No implants in log *    Operative Findings: significant hemoperitoneum with 350 cc blood evacuated, L hydrosalpinx, Ruptured R fallopian tube    Estimated Blood Loss: 600 cc (approx)    Estimated Blood Loss has been documented.         Specimens:   Specimen (24h ago, onward)       Start     Ordered    10/08/23 1257  Specimen to Pathology  RELEASE UPON ORDERING        References:    Click here for ordering Quick Tip   Question:  Release to patient  Answer:  Immediate    10/08/23 1257                    [id number]

## 2023-10-08 NOTE — ANESTHESIA POSTPROCEDURE EVALUATION
Anesthesia Post Evaluation    Patient: Sushma Tolentino    Procedure(s) Performed: Procedure(s) (LRB):  SALPINGO-OOPHORECTOMY, LAPAROSCOPIC (Right)    Final Anesthesia Type: general      Patient location during evaluation: PACU  Patient participation: Yes- Able to Participate  Level of consciousness: awake and alert and oriented  Post-procedure vital signs: reviewed and stable  Pain management: adequate  Airway patency: patent    PONV status at discharge: No PONV  Anesthetic complications: no      Cardiovascular status: hemodynamically stable  Respiratory status: unassisted  Hydration status: euvolemic  Follow-up not needed.          Vitals Value Taken Time   /67 10/08/23 1700   Temp 36.6 °C (97.8 °F) 10/08/23 1500   Pulse 81 10/08/23 1700   Resp 18 10/08/23 1700   SpO2 100 % 10/08/23 1500         Event Time   Out of Recovery 14:45:00         Pain/Gael Score: Pain Rating Prior to Med Admin: 7 (10/8/2023  3:20 PM)  Pain Rating Post Med Admin: 4 (10/8/2023  4:00 PM)  Gael Score: 9 (10/8/2023  2:30 PM)

## 2023-10-08 NOTE — SUBJECTIVE & OBJECTIVE
Obstetric HPI:  Concern for ruptured ectopic pregnancy      OB History    Para Term  AB Living   2 0 0 0 1 0   SAB IAB Ectopic Multiple Live Births   1 0 0 0 0      # Outcome Date GA Lbr Barry/2nd Weight Sex Delivery Anes PTL Lv   2 Current            1 2015 6w0d    SAB        Past Medical History:   Diagnosis Date    ADHD     Anxiety disorder, unspecified     Hyperthyroidism     Ovarian cyst      History reviewed. No pertinent surgical history.    (Not in a hospital admission)      Review of patient's allergies indicates:  No Known Allergies     Family History       Problem Relation (Age of Onset)    Diabetes Maternal Grandmother    Hypothyroidism Mother, Sister    No Known Problems Father          Tobacco Use    Smoking status: Former     Current packs/day: 0.00     Types: Cigarettes     Quit date:      Years since quittin.7    Smokeless tobacco: Current   Substance and Sexual Activity    Alcohol use: Yes     Alcohol/week: 1.0 standard drink of alcohol     Types: 1 Shots of liquor per week    Drug use: Yes     Frequency: 7.0 times per week     Types: Marijuana    Sexual activity: Yes     Partners: Male     Review of Systems   Constitutional: Negative.    Respiratory: Negative.     Cardiovascular: Negative.    Gastrointestinal: Negative.    Endocrine: Negative.    Genitourinary: Negative.    Musculoskeletal: Negative.    Neurological: Negative.    Psychiatric/Behavioral:  Positive for depression. The patient is nervous/anxious.    Breast: negative.       Objective:     Vital Signs (Most Recent):  Temp: 98.1 °F (36.7 °C) (10/08/23 0856)  Pulse: 68 (10/08/23 1001)  Resp: 18 (10/08/23 1000)  BP: 108/87 (10/08/23 1001)  SpO2: 99 % (10/08/23 1001) Vital Signs (24h Range):  Temp:  [98.1 °F (36.7 °C)] 98.1 °F (36.7 °C)  Pulse:  [] 68  Resp:  [18-22] 18  SpO2:  [98 %-100 %] 99 %  BP: (108-145)/() 108/87     Weight: 56.7 kg (125 lb)  Body mass index is 20.8 kg/m².         Physical Exam:    Constitutional: She is oriented to person, place, and time. She appears well-developed and well-nourished. She appears distressed.    HENT:   Head: Normocephalic and atraumatic.     Neck: No thyromegaly present.          Abdominal: She exhibits no mass. There is abdominal tenderness. There is rebound and guarding. No hernia.             Musculoskeletal: Normal range of motion and moves all extremeties. No tenderness.       Neurological: She is alert and oriented to person, place, and time. No cranial nerve deficit. Coordination normal.    Skin: She is diaphoretic.    Psychiatric: She has a normal mood and affect. Her behavior is normal. Judgment and thought content normal.          Significant Labs:  Lab Results   Component Value Date    GROUPTRH A POS 10/08/2023       CBC:   Recent Labs   Lab 10/08/23  0909   WBC 12.51*   RBC 4.30   HGB 14.0   HCT 39.9   *   MCV 92.8   MCH 32.6*   MCHC 35.1     I have personallly reviewed all pertinent lab results from the last 24 hours.

## 2023-10-09 VITALS
RESPIRATION RATE: 16 BRPM | WEIGHT: 125 LBS | BODY MASS INDEX: 20.83 KG/M2 | SYSTOLIC BLOOD PRESSURE: 103 MMHG | TEMPERATURE: 98 F | HEART RATE: 79 BPM | DIASTOLIC BLOOD PRESSURE: 68 MMHG | HEIGHT: 65 IN | OXYGEN SATURATION: 99 %

## 2023-10-09 LAB
BASOPHILS # BLD AUTO: 0.03 X10(3)/MCL
BASOPHILS NFR BLD AUTO: 0.3 %
EOSINOPHIL # BLD AUTO: 0.01 X10(3)/MCL (ref 0–0.9)
EOSINOPHIL NFR BLD AUTO: 0.1 %
ERYTHROCYTE [DISTWIDTH] IN BLOOD BY AUTOMATED COUNT: 12.9 % (ref 11.5–17)
HCT VFR BLD AUTO: 23.7 % (ref 37–47)
HGB BLD-MCNC: 8.3 G/DL (ref 12–16)
IMM GRANULOCYTES # BLD AUTO: 0.02 X10(3)/MCL (ref 0–0.04)
IMM GRANULOCYTES NFR BLD AUTO: 0.2 %
LYMPHOCYTES # BLD AUTO: 2.51 X10(3)/MCL (ref 0.6–4.6)
LYMPHOCYTES NFR BLD AUTO: 24.4 %
MCH RBC QN AUTO: 32.9 PG (ref 27–31)
MCHC RBC AUTO-ENTMCNC: 35 G/DL (ref 33–36)
MCV RBC AUTO: 94 FL (ref 80–94)
MONOCYTES # BLD AUTO: 1.03 X10(3)/MCL (ref 0.1–1.3)
MONOCYTES NFR BLD AUTO: 10 %
NEUTROPHILS # BLD AUTO: 6.7 X10(3)/MCL (ref 2.1–9.2)
NEUTROPHILS NFR BLD AUTO: 65 %
NRBC BLD AUTO-RTO: 0 %
PLATELET # BLD AUTO: 251 X10(3)/MCL (ref 130–400)
PMV BLD AUTO: 9.5 FL (ref 7.4–10.4)
RBC # BLD AUTO: 2.52 X10(6)/MCL (ref 4.2–5.4)
WBC # SPEC AUTO: 10.3 X10(3)/MCL (ref 4.5–11.5)

## 2023-10-09 PROCEDURE — 85025 COMPLETE CBC W/AUTO DIFF WBC: CPT | Performed by: OBSTETRICS & GYNECOLOGY

## 2023-10-09 PROCEDURE — 63600175 PHARM REV CODE 636 W HCPCS

## 2023-10-09 PROCEDURE — G0378 HOSPITAL OBSERVATION PER HR: HCPCS

## 2023-10-09 PROCEDURE — 25000003 PHARM REV CODE 250: Performed by: OBSTETRICS & GYNECOLOGY

## 2023-10-09 PROCEDURE — 25000003 PHARM REV CODE 250

## 2023-10-09 PROCEDURE — 63600175 PHARM REV CODE 636 W HCPCS: Performed by: OBSTETRICS & GYNECOLOGY

## 2023-10-09 RX ORDER — KETOROLAC TROMETHAMINE 30 MG/ML
30 INJECTION, SOLUTION INTRAMUSCULAR; INTRAVENOUS ONCE
Status: DISCONTINUED | OUTPATIENT
Start: 2023-10-09 | End: 2023-10-09

## 2023-10-09 RX ORDER — DIPHENHYDRAMINE HCL 25 MG
25 CAPSULE ORAL EVERY 4 HOURS PRN
Qty: 20 CAPSULE | Refills: 0 | Status: SHIPPED | OUTPATIENT
Start: 2023-10-09

## 2023-10-09 RX ORDER — KETOROLAC TROMETHAMINE 10 MG/1
10 TABLET, FILM COATED ORAL EVERY 6 HOURS
Qty: 20 TABLET | Refills: 0 | Status: SHIPPED | OUTPATIENT
Start: 2023-10-09 | End: 2023-10-14

## 2023-10-09 RX ORDER — OXYCODONE AND ACETAMINOPHEN 5; 325 MG/1; MG/1
1 TABLET ORAL EVERY 4 HOURS PRN
Qty: 20 EACH | Refills: 0 | Status: SHIPPED | OUTPATIENT
Start: 2023-10-09

## 2023-10-09 RX ORDER — OXYCODONE AND ACETAMINOPHEN 10; 325 MG/1; MG/1
1 TABLET ORAL EVERY 4 HOURS PRN
Status: DISCONTINUED | OUTPATIENT
Start: 2023-10-09 | End: 2023-10-09 | Stop reason: HOSPADM

## 2023-10-09 RX ORDER — KETOROLAC TROMETHAMINE 30 MG/ML
30 INJECTION, SOLUTION INTRAMUSCULAR; INTRAVENOUS ONCE
Status: COMPLETED | OUTPATIENT
Start: 2023-10-09 | End: 2023-10-09

## 2023-10-09 RX ORDER — OXYCODONE AND ACETAMINOPHEN 5; 325 MG/1; MG/1
1 TABLET ORAL EVERY 4 HOURS PRN
Status: DISCONTINUED | OUTPATIENT
Start: 2023-10-09 | End: 2023-10-09 | Stop reason: HOSPADM

## 2023-10-09 RX ADMIN — DIPHENHYDRAMINE HYDROCHLORIDE 25 MG: 25 CAPSULE ORAL at 01:10

## 2023-10-09 RX ADMIN — KETOROLAC TROMETHAMINE 30 MG: 30 INJECTION, SOLUTION INTRAMUSCULAR; INTRAVENOUS at 12:10

## 2023-10-09 RX ADMIN — HYDROCODONE BITARTRATE AND ACETAMINOPHEN 1 TABLET: 10; 325 TABLET ORAL at 08:10

## 2023-10-09 RX ADMIN — IBUPROFEN 800 MG: 800 TABLET, FILM COATED ORAL at 08:10

## 2023-10-09 RX ADMIN — HYDROCODONE BITARTRATE AND ACETAMINOPHEN 1 TABLET: 10; 325 TABLET ORAL at 12:10

## 2023-10-09 RX ADMIN — OXYCODONE HYDROCHLORIDE AND ACETAMINOPHEN 1 TABLET: 10; 325 TABLET ORAL at 12:10

## 2023-10-09 RX ADMIN — SODIUM CHLORIDE, POTASSIUM CHLORIDE, SODIUM LACTATE AND CALCIUM CHLORIDE: 600; 310; 30; 20 INJECTION, SOLUTION INTRAVENOUS at 01:10

## 2023-10-09 RX ADMIN — HYDROCODONE BITARTRATE AND ACETAMINOPHEN 1 TABLET: 10; 325 TABLET ORAL at 04:10

## 2023-10-09 NOTE — DISCHARGE SUMMARY
[facility]  Discharge Summary  Gynecology      Admit Date: 10/8/2023    Discharge Date and Time:  10/09/2023 3:56 PM    Attending Physician: Stanley Camacho MD     Discharge Provider: Kassidy López    Reason for Admission: Ruptured ectopic pregnancy    Procedures Performed: Diagnostic laparoscopy with right salpingectomy    Hospital Course (synopsis of major diagnoses, care, treatment, and services provided during the course of the hospital stay): 27 yo  who was admitted with ruptured ectopic pregnancy. Of note, patient was previously seen and admitted with PUL on . She elected for outpatient management and was seen for repeat beta hcg that was noted to have an appropriate decrease. She re-presented on 10/8/2023 with significant abdominal pain and a beta hg of 829. She was diagnosed with ruptured ectopic pregnancy and was taken for diagnostic laparoscopy where she underwent R salpingectomy. On POD#1, patient was found to be stable and meeting all discharge goals.     Consults: none    Significant Diagnostic Studies: N/A    Final Diagnoses:   Principal Problem: Ruptured ectopic pregnancy    Discharged Condition: good    Disposition: Home or Self Care    Follow Up/Patient Instructions:     Medications:  Reconciled Home Medications:      Medication List        START taking these medications      diphenhydrAMINE 25 mg capsule  Commonly known as: BENADRYL  Take 1 capsule (25 mg total) by mouth every 4 (four) hours as needed for Itching.     ketorolac 10 mg tablet  Commonly known as: TORADOL  Take 1 tablet (10 mg total) by mouth every 6 (six) hours. for 5 days     oxyCODONE-acetaminophen 5-325 mg per tablet  Commonly known as: PERCOCET  Take 1 tablet by mouth every 4 (four) hours as needed for Pain.            CONTINUE taking these medications      acetaminophen 325 MG tablet  Commonly known as: TYLENOL  Take 2 tablets (650 mg total) by mouth every 8 (eight) hours as needed.     buPROPion 150  MG TB24 tablet  Commonly known as: WELLBUTRIN XL  Take 150 mg by mouth every morning.     dextroamphetamine-amphetamine 20 mg tablet  Commonly known as: ADDERALL  Take 1 tablet by mouth 3 (three) times daily.            Discharge Procedure Orders   Diet Adult Regular     Pelvic Rest     Notify your health care provider if you experience any of the following:  temperature >100.4     Notify your health care provider if you experience any of the following:  persistent nausea and vomiting or diarrhea     Notify your health care provider if you experience any of the following:  severe uncontrolled pain     Notify your health care provider if you experience any of the following:  redness, tenderness, or signs of infection (pain, swelling, redness, odor or green/yellow discharge around incision site)     Notify your health care provider if you experience any of the following:  difficulty breathing or increased cough     Notify your health care provider if you experience any of the following:  persistent dizziness, light-headedness, or visual disturbances     Remove dressing in 24 hours     No dressing needed     Activity as tolerated      Follow-up Information       Clinics, [facility] Go to.    Why: keep scheduled gyn appt at [facility]; Rx for Percocet, Toradol and Benadryl  Contact information:  [address] [facility] [id number] [phone]                           Kassidy López,   [employer] [facility] PGY-2     Health Care Proxy (HCP)

## 2023-10-09 NOTE — NURSING
"Pt. Kings screaming in room. Pt states "I want more pain medicine". Pt. Tearful and breathing rapidly. Pain medication administered per MD order.  MD notified of pt's increasing anxiety/agitation. MD states "I have some concerns about the patient exhibiting drug seeking behavior". New orders noted.   "

## 2023-10-09 NOTE — NURSING
Written discharge instructions were given and reviewed, including meds, follow up appointments, and precautions to take at home. Pt's supportive person verbalized understanding.

## 2023-10-11 ENCOUNTER — OFFICE VISIT (OUTPATIENT)
Dept: GYNECOLOGY | Facility: CLINIC | Age: 28
End: 2023-10-11
Payer: MEDICAID

## 2023-10-11 VITALS
SYSTOLIC BLOOD PRESSURE: 113 MMHG | TEMPERATURE: 98 F | BODY MASS INDEX: 21.33 KG/M2 | OXYGEN SATURATION: 100 % | HEIGHT: 65 IN | WEIGHT: 128 LBS | DIASTOLIC BLOOD PRESSURE: 77 MMHG | RESPIRATION RATE: 18 BRPM | HEART RATE: 68 BPM

## 2023-10-11 DIAGNOSIS — Z98.890 POST-OPERATIVE STATE: Primary | ICD-10-CM

## 2023-10-11 DIAGNOSIS — O36.80X0 PREGNANCY OF UNKNOWN ANATOMIC LOCATION: ICD-10-CM

## 2023-10-11 LAB — PSYCHE PATHOLOGY RESULT: NORMAL

## 2023-10-11 PROCEDURE — 99213 OFFICE O/P EST LOW 20 MIN: CPT | Mod: PBBFAC

## 2023-10-11 NOTE — PROGRESS NOTES
[facility] OB/GYN CLINIC NOTE  [facility]  [address] [place] [address]  Phone: [phone]  Fax: [phone]    Subjective:     Sushma Tolentino is a 28 y.o.  who presents for ED follow-up s/p diagnostic laparoscopy with R salpingectomy on 10/08/23 2/2 ruptured ectopic pregnancy. Patient states she is feeling well overall. Her pain has been controlled with scheduled Oxycodone every 4 hours. She has moderate abdominal soreness. She is passing gas and had 1 bowel movement since surgery. Tolerating PO intake. Reports no vaginal bleeding but some clear,thin discharge. Before the surgery, she was sexually active with ~10 male partners in the past year. She has not been on on contraception since she was 20 years old; took LoLoestrin which was discontinued 1 year later due to AE. She is interested in starting OCPs again. Her LMP was mid July. Her periods are regular, lasing 4 days with heavy bleeding changing super tampons every other hour. She denies history of STIs or abnormal Pap smears. Denies N/V, fever, chills, headache, chest pain, shortness of breath, back pain, calf swelling, or dysuria.       OBHx:  OB History    Para Term  AB Living   2       2     SAB IAB Ectopic Multiple Live Births   1              # Outcome Date GA Lbr Barry/2nd Weight Sex Delivery Anes PTL Lv   2023           1 2015 6w0d    SAB          GynHx:  LMP: mid July  Pap Smear - last one ~2 years ago, no abnormal results in past   Unsure of HPV vaccination status    MedHx:   Past Medical History:   Diagnosis Date    ADHD     Anxiety disorder, unspecified     Hyperthyroidism     Ovarian cyst        SurgHx:   Past Surgical History:   Procedure Laterality Date    LAPAROSCOPIC SALPINGO-OOPHORECTOMY Right 10/8/2023    Procedure: SALPINGO-OOPHORECTOMY, LAPAROSCOPIC;  Surgeon: Stanley Camacho MD;  Location: [facility];  Service: OB/GYN;  Laterality: Right;       Medications:   Current Outpatient Medications   Medication  "Instructions    acetaminophen (TYLENOL) 650 mg, Oral, Every 8 hours PRN    buPROPion (WELLBUTRIN XL) 150 mg, Oral, Every morning    dextroamphetamine-amphetamine (ADDERALL) 20 mg tablet 1 tablet, Oral, 3 times daily    diphenhydrAMINE (BENADRYL) 25 mg, Oral, Every 4 hours PRN    ketorolac (TORADOL) 10 mg, Oral, Every 6 hours    oxyCODONE-acetaminophen (PERCOCET) 5-325 mg per tablet 1 tablet, Oral, Every 4 hours PRN       FM Hx:   Family History   Problem Relation Age of Onset    Hypothyroidism Mother     No Known Problems Father     Hypothyroidism Sister     Diabetes Maternal Grandmother       Denies hx of ovarian, uterine, endometrial, or colon cancer.    Social Hx:   Social History     Tobacco Use    Smoking status: Former     Current packs/day: 0.00     Types: Cigarettes     Quit date:      Years since quittin.7    Smokeless tobacco: Current   Substance Use Topics    Alcohol use: Yes     Alcohol/week: 1.0 standard drink of alcohol     Types: 1 Shots of liquor per week    Drug use: Yes     Frequency: 7.0 times per week     Types: Marijuana      Smoked 1 pack a week for 3 years in the past, quit over 5 years ago.   Drinks alcohol weekly socially.   Smokes marijuana daily.    Review of Systems  ROS negative unless indicated in HPI.     Objective:     Vitals:    10/11/23 0914   BP: 113/77   BP Location: Left arm   Patient Position: Sitting   BP Method: Small (Automatic)   Pulse: 68   Resp: 18   Temp: 97.6 °F (36.4 °C)   SpO2: 100%   Weight: 58.1 kg (128 lb)   Height: 5' 5" (1.651 m)     Body mass index is 21.3 kg/m².    Physical Exam:   General: alert and oriented, in no acute distress  Lungs: clear to auscultation bilaterally, no conversational dyspnea  Heart: RRR  Abdomen: Soft, non-distended, RLQ mildly tender to palpation, no involuntary guarding, no rebound tenderness normoactive bowel sounds  Extremities: Normal, atraumatic, non-edematous, non-tender, bilaterally   Skin: multiple incision sites " identified. No obvious surrounding erythema or drainage present. No obvious signs of infection present at this time.     Labs  No results found for this or any previous visit (from the past 24 hour(s)).    Imaging  No images are attached to the encounter.    Assessment:   28 y.o.  who presents for ED follow-up s/p diagnostic laparoscopy with R salpingectomy on 10/08/23 2/2 ruptured ectopic pregnancy.    Plan:     Problem List Items Addressed This Visit          Palliative Care    Post-operative state - Primary     S/p R salpingectomy 10/8/23  Incision sites healing well at this time; no obvious signs of infection present at this time.  Pain well controlled with Norco 5mg. Discussed alternating Tylenol and Ibuprofen 1000mg tid for pain control and weaning off of narcotics.   She is able to drive once she is off of narcotics and severe pain has subsided   Will discuss resumption of LoLoestrin at 2 week follow up   Strict ED precautions discussed; pt expressed understanding  Continue with routine post-operative follow up in 2 weeks             Patient and plan were discussed with Dr. Parsons.    Henry Taylor MD  [facility] -

## 2023-10-11 NOTE — ASSESSMENT & PLAN NOTE
S/p R salpingectomy 10/8/23 2/2 ruptured ectopic pregnancy  Incision sites healing well at this time; no obvious signs of infection present at this time.  Pain well controlled with Norco 5mg. Discussed alternating Tylenol and Ibuprofen 1000mg tid for pain control and weaning off of narcotics.   She is able to drive once she is off of narcotics and severe pain has subsided   Will discuss resumption of LoLoestrin at 2 week follow up   Strict ED precautions discussed; pt expressed understanding  Continue with routine post-operative follow up in 2 weeks

## 2024-02-16 ENCOUNTER — HOSPITAL ENCOUNTER (EMERGENCY)
Facility: HOSPITAL | Age: 29
Discharge: HOME | End: 2024-02-16
Attending: EMERGENCY MEDICINE
Payer: MEDICAID

## 2024-02-16 VITALS
TEMPERATURE: 98 F | DIASTOLIC BLOOD PRESSURE: 89 MMHG | BODY MASS INDEX: 22.38 KG/M2 | HEART RATE: 87 BPM | OXYGEN SATURATION: 99 % | SYSTOLIC BLOOD PRESSURE: 128 MMHG | WEIGHT: 134.5 LBS | RESPIRATION RATE: 18 BRPM

## 2024-02-16 DIAGNOSIS — S61.011A THUMB LACERATION, RIGHT, INITIAL ENCOUNTER: Primary | ICD-10-CM

## 2024-02-16 LAB
B-HCG UR QL: NEGATIVE
CTP QC/QA: YES

## 2024-02-16 PROCEDURE — 25000003 PHARM REV CODE 250: Performed by: PHYSICIAN ASSISTANT

## 2024-02-16 PROCEDURE — 81025 URINE PREGNANCY TEST: CPT | Performed by: PHYSICIAN ASSISTANT

## 2024-02-16 PROCEDURE — 99284 EMERGENCY DEPT VISIT MOD MDM: CPT | Mod: 25

## 2024-02-16 PROCEDURE — 63600175 PHARM REV CODE 636 W HCPCS: Performed by: PHYSICIAN ASSISTANT

## 2024-02-16 PROCEDURE — 90715 TDAP VACCINE 7 YRS/> IM: CPT | Performed by: PHYSICIAN ASSISTANT

## 2024-02-16 PROCEDURE — 12001 RPR S/N/AX/GEN/TRNK 2.5CM/<: CPT

## 2024-02-16 PROCEDURE — 90471 IMMUNIZATION ADMIN: CPT | Performed by: PHYSICIAN ASSISTANT

## 2024-02-16 RX ORDER — LIDOCAINE HYDROCHLORIDE 10 MG/ML
10 INJECTION, SOLUTION EPIDURAL; INFILTRATION; INTRACAUDAL; PERINEURAL
Status: COMPLETED | OUTPATIENT
Start: 2024-02-16 | End: 2024-02-16

## 2024-02-16 RX ORDER — HYDROCODONE BITARTRATE AND ACETAMINOPHEN 7.5; 325 MG/1; MG/1
1 TABLET ORAL ONCE
Status: COMPLETED | OUTPATIENT
Start: 2024-02-16 | End: 2024-02-16

## 2024-02-16 RX ADMIN — LIDOCAINE HYDROCHLORIDE 100 MG: 10 INJECTION, SOLUTION EPIDURAL; INFILTRATION; INTRACAUDAL; PERINEURAL at 05:02

## 2024-02-16 RX ADMIN — HYDROCODONE BITARTRATE AND ACETAMINOPHEN 1 TABLET: 7.5; 325 TABLET ORAL at 05:02

## 2024-02-16 RX ADMIN — TETANUS TOXOID, REDUCED DIPHTHERIA TOXOID AND ACELLULAR PERTUSSIS VACCINE, ADSORBED 0.5 ML: 5; 2.5; 8; 8; 2.5 SUSPENSION INTRAMUSCULAR at 06:02

## 2024-02-16 NOTE — ED PROVIDER NOTES
Encounter Date: 2024       History     Chief Complaint   Patient presents with    THUMB LAC     SM RT THUMB LAC ON BROKEN CLASS WHILE WASHING DISHES PTA. NO ACTIVE BLEEDING, WOUND DRESSED. NVI DISTAL TO INJURY.  NEEDS Td.      28-year-old female with past medical history significant for anxiety presents to ED with laceration to base of right thumb long dorsal aspect, along with small laceration to 2nd MTP joint.  Patient reports these lacerations occurred just prior to arrival when she was washing dishes and a glass broke.  Denies numbness, paresthesia, paralysis, pallor, poikilothermia, decreased range of motion.  Vital signs stable on arrival, patient in no acute distress.      Review of patient's allergies indicates:  No Known Allergies  Past Medical History:   Diagnosis Date    ADHD     Anxiety disorder, unspecified     Hyperthyroidism     Ovarian cyst      Past Surgical History:   Procedure Laterality Date    LAPAROSCOPIC SALPINGO-OOPHORECTOMY Right 10/8/2023    Procedure: SALPINGO-OOPHORECTOMY, LAPAROSCOPIC;  Surgeon: Stanley Camacho MD;  Location: [facility];  Service: OB/GYN;  Laterality: Right;     Family History   Problem Relation Age of Onset    Hypothyroidism Mother     No Known Problems Father     Hypothyroidism Sister     Diabetes Maternal Grandmother      Social History     Tobacco Use    Smoking status: Former     Current packs/day: 0.00     Types: Cigarettes     Quit date: 2017     Years since quittin.1    Smokeless tobacco: Current   Substance Use Topics    Alcohol use: Yes     Alcohol/week: 1.0 standard drink of alcohol     Types: 1 Shots of liquor per week    Drug use: Yes     Frequency: 7.0 times per week     Types: Marijuana     Review of Systems   All other systems reviewed and are negative.      Physical Exam     Initial Vitals [24 1556]   BP Pulse Resp Temp SpO2   (!) 141/98 98 16 97.9 °F (36.6 °C) 100 %      MAP       --         Physical Exam    Nursing note and vitals  reviewed.  Constitutional: She appears well-developed and well-nourished. She is not diaphoretic. No distress.   HENT:   Head: Normocephalic and atraumatic.   Mouth/Throat: Oropharynx is clear and moist.   Eyes: Conjunctivae and EOM are normal. Pupils are equal, round, and reactive to light.   Neck: Neck supple.   Normal range of motion.  Cardiovascular:  Normal rate, regular rhythm, normal heart sounds and intact distal pulses.     Exam reveals no gallop and no friction rub.       No murmur heard.  Pulmonary/Chest: Breath sounds normal. No respiratory distress. She has no wheezes. She has no rhonchi. She has no rales.   Abdominal: Abdomen is soft. Bowel sounds are normal. She exhibits no distension. There is no abdominal tenderness. There is no rebound and no guarding.   Musculoskeletal:         General: No edema. Normal range of motion.      Right wrist: Normal. Normal pulse.      Right hand: Laceration and tenderness (at site of thumb laceration) present. No swelling or deformity. Normal range of motion. Normal sensation. Normal capillary refill.      Left hand: Tenderness: at site of thumb lac.        Hands:       Cervical back: Normal range of motion and neck supple.     Neurological: She is alert and oriented to person, place, and time. She has normal strength.   Skin: Skin is warm and dry. No rash noted.   Psychiatric: She has a normal mood and affect. Thought content normal.         ED Course   Lac Repair    Date/Time: 2/16/2024 6:58 PM    Performed by: Emanuel Wilson PA  Authorized by: Jasiel Olmos MD    Consent:     Consent obtained:  Verbal    Consent given by:  Patient    Risks, benefits, and alternatives were discussed: yes      Risks discussed:  Infection, pain, need for additional repair, nerve damage, poor wound healing, poor cosmetic result, tendon damage and vascular damage    Alternatives discussed:  No treatment, delayed treatment, observation and referral  Universal protocol:      Procedure explained and questions answered to patient or proxy's satisfaction: yes      Imaging studies available: yes      Patient identity confirmed:  Verbally with patient and arm band  Anesthesia:     Anesthesia method:  Local infiltration    Local anesthetic:  Lidocaine 1% w/o epi  Laceration details:     Location:  Hand    Hand location:  R hand, dorsum    Length (cm):  2    Depth (mm):  1  Pre-procedure details:     Preparation:  Patient was prepped and draped in usual sterile fashion and imaging obtained to evaluate for foreign bodies  Treatment:     Area cleansed with:  Povidone-iodine and saline    Amount of cleaning:  Extensive    Irrigation solution:  Sterile saline    Irrigation method:  Pressure wash  Skin repair:     Repair method:  Sutures    Suture size:  5-0    Suture material:  Nylon    Suture technique:  Simple interrupted    Number of sutures:  5  Approximation:     Approximation:  Close  Repair type:     Repair type:  Simple  Post-procedure details:     Dressing:  Sterile dressing    Procedure completion:  Tolerated well, no immediate complications    Labs Reviewed   POCT URINE PREGNANCY          Imaging Results              X-Ray Hand 2 View Right (Final result)  Result time 02/16/24 17:49:32      Final result by Andi Mccormack MD (02/16/24 17:49:32)                   Narrative:    EXAMINATION  XR HAND 2 VIEW RIGHT    CLINICAL HISTORY  Laceration without foreign body of right thumb without damage to nail, initial encounter    TECHNIQUE  A total of 2 images submitted of the right hand.    COMPARISON  None available at the time of initial interpretation.    FINDINGS  Overall exam quality is degraded secondary to numerous metallic rings that could not be removed from the 3rd digit, obscuring several osseous structures.  Within limitations, no displaced fracture or dislocation is identified. The visualized joint spaces are preserved. No aggressive osseous lesion or periosteal reaction is  identified.    The included soft tissues are without acute abnormality.    IMPRESSION  Within limitations discussed above, no convincing radiographic abnormality.      Electronically signed by: Andi Mccormack  Date:    02/16/2024  Time:    17:49                                     Medications   HYDROcodone-acetaminophen 7.5-325 mg per tablet 1 tablet (1 tablet Oral Given 2/16/24 1753)   Tdap (BOOSTRIX) vaccine injection 0.5 mL (0.5 mLs Intramuscular Given 2/16/24 5062)   LIDOcaine (PF) 10 mg/ml (1%) injection 100 mg (100 mg Infiltration Given 2/16/24 1753)     Medical Decision Making  Differential diagnosis:  Laceration, fracture, need for tetanus update     ED management:  Patient given p.o. Norco and Tdap updated, pain improved.  Laceration repaired with sutures, see procedure note.      ED course:  There is no acute osseous abnormality on x-ray of right hand.  Patient is neurovascularly intact on exam.  Nontoxic appearing with normal vitals, stable for discharge.  I instructed patient to return to ED in 7-10 days for suture removal or sooner for any new or worsening symptoms.  Home hygiene instructions given, patient reports she has Neosporin at home.  All questions answered.    Amount and/or Complexity of Data Reviewed  Labs: ordered. Decision-making details documented in ED Course.  Radiology: ordered and independent interpretation performed. Decision-making details documented in ED Course.    Risk  Prescription drug management.                                      Clinical Impression:  Final diagnoses:  [S61.011A] Thumb laceration, right, initial encounter (Primary)          ED Disposition Condition    Discharge Good          ED Prescriptions    None       Follow-up Information       Follow up With Specialties Details Why Contact Info    [facility] - Emergency Dept Emergency Medicine  Return to ED in 7-10 days for suture removal.  Return sooner for any new or worsening symptoms. [address]  [facility] [id number]  [phone]             Emanuel Wilson PA  02/16/24 185

## 2024-02-17 NOTE — DISCHARGE INSTRUCTIONS
Report to Emergency Department if symptoms return or worsen; [facility] - Medicine Clinic Within 1 to 2 days, It is important that you follow up with your primary care provider or specialist if indicated for further evaluation, workup, and treatment as necessary. The exam and treatment you received in Emergency Department was for an urgent problem and NOT INTENDED AS COMPLETE CARE. It is important that you FOLLOW UP with a doctor for ongoing care. If your symptoms become WORSE or you DO NOT IMPROVE and you are unable to reach your health care provider, you should RETURN to the Emergency Department. The Emergency Department provider has provided a PRELIMINARY INTERPRETATION of all your studies. A final interpretation may be done after you are discharged. If a change in your diagnosis or treatment is needed WE WILL CONTACT YOU. It is critical that we have a CURRENT PHONE NUMBER FOR YOU.

## 2024-03-19 ENCOUNTER — HOSPITAL ENCOUNTER (EMERGENCY)
Facility: HOSPITAL | Age: 29
Discharge: HOME | End: 2024-03-19
Attending: EMERGENCY MEDICINE
Payer: MEDICAID

## 2024-03-19 VITALS
SYSTOLIC BLOOD PRESSURE: 120 MMHG | WEIGHT: 133.25 LBS | HEART RATE: 99 BPM | OXYGEN SATURATION: 99 % | RESPIRATION RATE: 17 BRPM | BODY MASS INDEX: 22.2 KG/M2 | HEIGHT: 65 IN | DIASTOLIC BLOOD PRESSURE: 88 MMHG | TEMPERATURE: 98 F

## 2024-03-19 DIAGNOSIS — H92.02 LEFT EAR PAIN: Primary | ICD-10-CM

## 2024-03-19 DIAGNOSIS — R22.0 LUMP ON FACE: ICD-10-CM

## 2024-03-19 PROCEDURE — 99283 EMERGENCY DEPT VISIT LOW MDM: CPT

## 2024-03-19 RX ORDER — NEOMYCIN SULFATE, POLYMYXIN B SULFATE AND HYDROCORTISONE 10; 3.5; 1 MG/ML; MG/ML; [USP'U]/ML
4 SUSPENSION/ DROPS AURICULAR (OTIC) 3 TIMES DAILY
Qty: 10 ML | Refills: 0 | Status: SHIPPED | OUTPATIENT
Start: 2024-03-19 | End: 2024-03-24

## 2024-03-20 NOTE — ED PROVIDER NOTES
Encounter Date: 3/19/2024       History     Chief Complaint   Patient presents with    Otalgia     States left ear pain and right facial swelling.  States swelling started small in January 2023 and has grown since.  Presently size of large grape.       Patient reports to the emergency room with complaints of left ear pain that started earlier today; patient reports improvement after taking Tylenol; patient also reports to the emergency room with complaints of a right-sided facial lump/growth that has been present for the past year in his slowly been growing in size    The history is provided by the patient.   Otalgia  This is a new problem. The current episode started today. There is pain in the left ear. The problem occurs throughout the day. The problem has been gradually improving. Pertinent negatives include no rhinorrhea, no sore throat, no diarrhea, no vomiting and no rash.     Review of patient's allergies indicates:  No Known Allergies  Past Medical History:   Diagnosis Date    ADHD     Anxiety disorder, unspecified     Hyperthyroidism     Ovarian cyst      Past Surgical History:   Procedure Laterality Date    LAPAROSCOPIC SALPINGO-OOPHORECTOMY Right 10/8/2023    Procedure: SALPINGO-OOPHORECTOMY, LAPAROSCOPIC;  Surgeon: Stanley Camacho MD;  Location: [facility];  Service: OB/GYN;  Laterality: Right;     Family History   Problem Relation Age of Onset    Hypothyroidism Mother     No Known Problems Father     Hypothyroidism Sister     Diabetes Maternal Grandmother      Social History     Tobacco Use    Smokeless tobacco: Current   Substance Use Topics    Alcohol use: Yes     Alcohol/week: 1.0 standard drink of alcohol     Types: 1 Shots of liquor per week    Drug use: Yes     Frequency: 7.0 times per week     Types: Marijuana     Review of Systems   Constitutional:  Negative for fever.   HENT:  Positive for ear pain and facial swelling. Negative for rhinorrhea and sore throat.    Eyes: Negative.    Respiratory:   Negative for shortness of breath.    Cardiovascular:  Negative for chest pain.   Gastrointestinal:  Negative for diarrhea, nausea and vomiting.   Genitourinary:  Negative for dysuria.   Musculoskeletal:  Negative for back pain.   Skin:  Negative for rash.   Neurological:  Negative for weakness.   Hematological:  Does not bruise/bleed easily.   Psychiatric/Behavioral: Negative.         Physical Exam     Initial Vitals [03/19/24 2054]   BP Pulse Resp Temp SpO2   120/88 99 17 98.1 °F (36.7 °C) 99 %      MAP       --         Physical Exam    Vitals reviewed.  Constitutional: She appears well-developed and well-nourished.   HENT:   Head: Normocephalic and atraumatic.       Right Ear: External ear normal.   Left Ear: External ear normal.   Nose: Nose normal.   Mouth/Throat: Oropharynx is clear and moist.   right sided exterior facial mass/cyst like area anterior to the right ear approx 1cm x 1cm; non tender, non erythema, mobile, soft     Eyes: Conjunctivae and EOM are normal. Pupils are equal, round, and reactive to light.   Neck: Neck supple.   Normal range of motion.  Cardiovascular:  Normal rate, regular rhythm, normal heart sounds and intact distal pulses.           Pulmonary/Chest: Breath sounds normal. No respiratory distress. She has no wheezes. She exhibits no tenderness.   Abdominal: Abdomen is soft. Bowel sounds are normal. There is no abdominal tenderness.   Musculoskeletal:         General: Normal range of motion.      Cervical back: Normal range of motion and neck supple.     Neurological: She is alert and oriented to person, place, and time. She displays normal reflexes. No cranial nerve deficit or sensory deficit.   Skin: Skin is warm and dry.   Psychiatric: She has a normal mood and affect. Her behavior is normal. Judgment and thought content normal.         ED Course   Procedures  Labs Reviewed - No data to display       Imaging Results    None          Medications - No data to display  Medical Decision  Making  Discussed with patient ordering labs and a CT in order to obtain better imaging an idea of what is going on in the right side of her face, however the patient declined to have this done at this time and prefers to have referral sent and had a very done outpatient; patient given strict ER precautions to return to the emergency room if she starts having any issues swallowing, issues with vision, or issues opening her jaw    Risk  Prescription drug management.  Risk Details: Given strict ED return precautions. I have spoken with the patient and/or caregivers. I have explained the patient's condition, diagnoses and treatment plan based on the information available to me at this time. I have answered the patient's and/or caregiver's questions and addressed any concerns. The patient and/or caregivers have as good an understanding of the patient's diagnosis, condition and treatment plan as can be expected at this point. The vital signs have been stable. The patient's condition is stable and appropriate for discharge from the emergency department.      The patient will pursue further outpatient evaluation with the primary care physician or other designated or consulting physician as outlined in the discharge instructions. The patient and/or caregivers are agreeable to this plan of care and follow-up instructions have been explained in detail. The patient and/or caregivers have received these instructions in written format and have expressed an understanding of the discharge instructions. The patient and/or caregivers are aware that any significant change in condition or worsening of symptoms should prompt an immediate return to this or the closest emergency department or a call to 911.                                      Clinical Impression:  Final diagnoses:  [H92.02] Left ear pain (Primary)  [R22.0] Lump on face          ED Disposition Condition    Discharge Stable          ED Prescriptions       Medication Sig  Dispense Start Date End Date Auth. Provider    neomycin-polymyxin-hydrocortisone (CORTISPORIN) 3.5-10,000-1 mg/mL-unit/mL-% otic suspension Place 4 drops into the left ear 3 (three) times daily. for 5 days 10 mL 3/19/2024 3/24/2024 Ladarius Taylor PA          Follow-up Information       Follow up With Specialties Details Why Contact Info    discharge followup    If your symptoms become WORSE or you DO NOT IMPROVE and you are unable to reach your health care provider, you should RETURN to the emergency department    discharge info    Discussed all pertinent ED information, results, diagnosis and treatment plan; All questions and concerns were addressed at this time. Patient voices understanding of information and instructions. Patient is comfortable with plan and discharge             Ladarius Taylor PA  03/19/24 3521

## 2024-04-25 ENCOUNTER — OFFICE VISIT (OUTPATIENT)
Dept: URGENT CARE | Facility: CLINIC | Age: 29
End: 2024-04-25
Payer: MEDICAID

## 2024-04-25 VITALS
HEIGHT: 65 IN | RESPIRATION RATE: 20 BRPM | TEMPERATURE: 97 F | HEART RATE: 104 BPM | BODY MASS INDEX: 22.33 KG/M2 | WEIGHT: 134 LBS | DIASTOLIC BLOOD PRESSURE: 80 MMHG | OXYGEN SATURATION: 100 % | SYSTOLIC BLOOD PRESSURE: 126 MMHG

## 2024-04-25 DIAGNOSIS — J02.9 SORE THROAT: Primary | ICD-10-CM

## 2024-04-25 LAB
CTP QC/QA: YES
MOLECULAR STREP A: NEGATIVE

## 2024-04-25 PROCEDURE — 87651 STREP A DNA AMP PROBE: CPT | Mod: PBBFAC | Performed by: STUDENT IN AN ORGANIZED HEALTH CARE EDUCATION/TRAINING PROGRAM

## 2024-04-25 PROCEDURE — 99213 OFFICE O/P EST LOW 20 MIN: CPT | Mod: S$PBB,,, | Performed by: STUDENT IN AN ORGANIZED HEALTH CARE EDUCATION/TRAINING PROGRAM

## 2024-04-25 PROCEDURE — 99214 OFFICE O/P EST MOD 30 MIN: CPT | Mod: PBBFAC | Performed by: STUDENT IN AN ORGANIZED HEALTH CARE EDUCATION/TRAINING PROGRAM

## 2024-04-25 RX ORDER — DEXTROAMPHETAMINE SACCHARATE, AMPHETAMINE ASPARTATE, DEXTROAMPHETAMINE SULFATE AND AMPHETAMINE SULFATE 7.5; 7.5; 7.5; 7.5 MG/1; MG/1; MG/1; MG/1
1 TABLET ORAL 2 TIMES DAILY
COMMUNITY
Start: 2024-04-03

## 2024-04-25 RX ORDER — AMOXICILLIN AND CLAVULANATE POTASSIUM 875; 125 MG/1; MG/1
1 TABLET, FILM COATED ORAL 2 TIMES DAILY
Qty: 20 TABLET | Refills: 0 | Status: SHIPPED | OUTPATIENT
Start: 2024-04-25 | End: 2024-05-05

## 2024-04-25 NOTE — PROGRESS NOTES
"Subjective:      Patient ID: Sushma Tolentino is a 29 y.o. female.    Vitals:  height is 5' 5" (1.651 m) and weight is 60.8 kg (134 lb). Her oral temperature is 97.4 °F (36.3 °C). Her blood pressure is 126/80 and her pulse is 104. Her respiration is 20 and oxygen saturation is 100%.     Chief Complaint: Sore Throat (Sore throat, difficulty swallowing and vomiting x 3 days)    Sore Throat       Sushma Tolentino is a 29-year-old female presenting to the urgent care clinic today with sore throat the that has been going on for the past 3 days.  Patient states that she has been having pain with swallowing and has been gagging/vomiting due to the sore throat.  Patient denies any fevers or chills.  Patient denies any chest pain or any shortness of breath.  Patient denies any coughing.  Patient denies any GI or  symptoms.    HENT:  Positive for sore throat.       Objective:     Physical Exam   Constitutional: She is oriented to person, place, and time. She appears well-developed. She is cooperative.  Non-toxic appearance. She does not appear ill. No distress.   HENT:   Head: Normocephalic and atraumatic.   Ears:   Right Ear: Hearing, tympanic membrane, external ear and ear canal normal.   Left Ear: Hearing, tympanic membrane, external ear and ear canal normal.   Nose: Nose normal. No mucosal edema, rhinorrhea or nasal deformity. No epistaxis. Right sinus exhibits no maxillary sinus tenderness and no frontal sinus tenderness. Left sinus exhibits no maxillary sinus tenderness and no frontal sinus tenderness.   Mouth/Throat: Uvula is midline and mucous membranes are normal. No trismus in the jaw. Normal dentition. No uvula swelling. Oropharyngeal exudate (Bilateral tonsillar exudate without any tonsillar abscess) and posterior oropharyngeal erythema present. No posterior oropharyngeal edema.   Eyes: Conjunctivae and lids are normal. No scleral icterus.   Neck: Trachea normal and phonation normal. Neck supple. No edema " present. No erythema present. No neck rigidity present.   Cardiovascular: Normal rate, regular rhythm, normal heart sounds and normal pulses.   Pulmonary/Chest: Effort normal and breath sounds normal. No respiratory distress. She has no decreased breath sounds. She has no rhonchi.   Abdominal: Normal appearance.   Musculoskeletal: Normal range of motion.         General: No deformity. Normal range of motion.   Neurological: She is alert and oriented to person, place, and time. She exhibits normal muscle tone. Coordination normal.   Skin: Skin is warm, dry, intact, not diaphoretic and not pale.   Psychiatric: Her speech is normal and behavior is normal. Judgment and thought content normal.   Nursing note and vitals reviewed.      Assessment:     1. Sore throat        Plan:     Patient presents to the urgent care clinic today with sore throat.  Physical examination with bilateral tonsillar exudates.  Unable to get a strep throat swab due to patient's inability to tolerate.  We will initiate treatment with Augmentin based on physical examination findings.  Return to clinic/ER going precautions discussed with patient and her partner.    Sore throat  -     POCT Strep A, Molecular    Other orders  -     amoxicillin-clavulanate 875-125mg (AUGMENTIN) 875-125 mg per tablet; Take 1 tablet by mouth 2 (two) times daily. for 10 days  Dispense: 20 tablet; Refill: 0      This note is dictated using the M*Modal Fluency Direct word recognition program. There are word recognition mistakes that are occasionally missed on review.    Sada Huff MD

## 2024-06-24 NOTE — PROGRESS NOTES
[facility]  Otolaryngology Clinic Note    Sushma Cerda  YOB: 1995    Chief Complaint:   Chief Complaint   Patient presents with    referral: Left Ear Pain        HPI: 06/26/2024: 29 y.o. female referred for left ear pain, right facial mass. States she is no longer having any issues with her ear. She does have a mass to her right face which has been present for about 1.5 years and slowly growing over time. It is not painful & has never drained or become inflamed. She denies any dry mouth or bad taste in her mouth. She vapes nicotine daily. She is in an upcoming wedding this fall & anxious to have it removed.      ROS:   10-point review of systems negative except per HPI      Review of patient's allergies indicates:  No Known Allergies    Past Medical History:   Diagnosis Date    ADHD     Anxiety disorder, unspecified     Hyperparathyroidism 2020    Hyperthyroidism     Ovarian cyst        Past Surgical History:   Procedure Laterality Date    LAPAROSCOPIC SALPINGO-OOPHORECTOMY Right 10/08/2023    Procedure: SALPINGO-OOPHORECTOMY, LAPAROSCOPIC;  Surgeon: Stanley Camacho MD;  Location: [facility];  Service: OB/GYN;  Laterality: Right;       Social History     Socioeconomic History    Marital status: Single   Tobacco Use    Smoking status: Every Day     Current packs/day: 0.50     Average packs/day: 0.5 packs/day for 4.0 years (2.0 ttl pk-yrs)     Types: Vaping with nicotine, Cigarettes    Smokeless tobacco: Current   Substance and Sexual Activity    Alcohol use: Yes     Alcohol/week: 3.0 standard drinks of alcohol     Types: 3 Glasses of wine per week    Drug use: Yes     Frequency: 14.0 times per week     Types: Amphetamines, Marijuana    Sexual activity: Yes     Partners: Male     Birth control/protection: None       Family History   Problem Relation Name Age of Onset    Hypothyroidism Mother bailey cerda     Thyroid disease Mother bailey cerda         hypothyroidism    No  Known Problems Father      Hypothyroidism Sister      Diabetes Maternal Grandmother ishan quintero     Cancer Maternal Grandfather cosmo quintero         lung cancer       Outpatient Encounter Medications as of 6/26/2024   Medication Sig Dispense Refill    buPROPion (WELLBUTRIN XL) 150 MG TB24 tablet Take 150 mg by mouth every morning.      dextroamphetamine-amphetamine (ADDERALL) 20 mg tablet Take 1 tablet by mouth 3 (three) times daily.      acetaminophen (TYLENOL) 325 MG tablet Take 2 tablets (650 mg total) by mouth every 8 (eight) hours as needed. (Patient not taking: Reported on 10/11/2023) 30 tablet 0    diphenhydrAMINE (BENADRYL) 25 mg capsule Take 1 capsule (25 mg total) by mouth every 4 (four) hours as needed for Itching. (Patient not taking: Reported on 4/25/2024) 20 capsule 0    oxyCODONE-acetaminophen (PERCOCET) 5-325 mg per tablet Take 1 tablet by mouth every 4 (four) hours as needed for Pain. (Patient not taking: Reported on 4/25/2024) 20 each 0     No facility-administered encounter medications on file as of 6/26/2024.       Physical Exam:  Vitals:    06/26/24 1246   BP: 114/77   BP Location: Right arm   Patient Position: Sitting   BP Method: Medium (Automatic)   Pulse: 70   Temp: 98.3 °F (36.8 °C)   TempSrc: Oral       Physical Exam   General: NAD, voice normal  Neuro: AAO, CN II - XII grossly intact  Head/ Face: NCAT, symmetric, sensations intact bilaterally. Roughly 4cm mass to right preauricular area which is firm and fairly mobile, nontender. There is not a visible pore associated with the mass.    Eyes: EOMI, PERRL  Ears: externally normal with grossly normal hearing  AD: EAC patent, TM intact, no middle ear effusion, no retractions  AS: EAC patent, TM intact, no middle ear effusion, no retractions  Nose: bilateral nares patent, midline septum, no rhinorrhea, no external deformity, no turbinate hypertrophy  OC/OP: MMM, no intraoral lesions, FOM/BOT soft, no trismus, dentition is moderate, no uvular  deviation, bilaterally symmetric soft palate elevation, palatoglossus and palatopharyngeal fold wnl; tonsils are symmetric and 1+  Indirect laryngoscopy: deferred due to patient intolerance  Neck: soft, supple, no LAD, normal ROM, no thyromegaly. B/l parotid & SMD glands normal to palpation  Respiratory: nonlabored, no wheezing, bilateral chest rise  Cardiovascular: RRR  Gastrointestinal: S NT ND  Skin: warm, no lesions  Musculoskeletal: 5/5 strength  Psych: Appropriate affect/mood     Pertinent Data:  ? LABS:  ? AUDIO:           ? PATH:      Imaging:   I personally reviewed the following images:        Assessment/Plan:  29 y.o. female with right facial/neck mass. D/w Dr. Childress, likely either parotid lesion or lipoma.   - CT neck with and without  - Telemed 2-3 weeks to review images & determine whether FNA will be indicated prior to scheduling surgery    Anamaria Leal NP

## 2024-06-26 ENCOUNTER — OFFICE VISIT (OUTPATIENT)
Dept: OTOLARYNGOLOGY | Facility: CLINIC | Age: 29
End: 2024-06-26
Payer: MEDICAID

## 2024-06-26 VITALS — TEMPERATURE: 98 F | HEART RATE: 70 BPM | DIASTOLIC BLOOD PRESSURE: 77 MMHG | SYSTOLIC BLOOD PRESSURE: 114 MMHG

## 2024-06-26 DIAGNOSIS — R22.0 FACIAL MASS: Primary | ICD-10-CM

## 2024-06-26 DIAGNOSIS — R22.1 NECK MASS: ICD-10-CM

## 2024-06-26 PROCEDURE — 3078F DIAST BP <80 MM HG: CPT | Mod: CPTII,,, | Performed by: NURSE PRACTITIONER

## 2024-06-26 PROCEDURE — 3074F SYST BP LT 130 MM HG: CPT | Mod: CPTII,,, | Performed by: NURSE PRACTITIONER

## 2024-06-26 PROCEDURE — 99213 OFFICE O/P EST LOW 20 MIN: CPT | Mod: PBBFAC | Performed by: NURSE PRACTITIONER

## 2024-06-26 PROCEDURE — 99203 OFFICE O/P NEW LOW 30 MIN: CPT | Mod: S$PBB,,, | Performed by: NURSE PRACTITIONER

## 2024-06-26 PROCEDURE — 1159F MED LIST DOCD IN RCRD: CPT | Mod: CPTII,,, | Performed by: NURSE PRACTITIONER

## 2024-07-09 ENCOUNTER — HOSPITAL ENCOUNTER (OUTPATIENT)
Dept: RADIOLOGY | Facility: HOSPITAL | Age: 29
Discharge: HOME | End: 2024-07-09
Attending: NURSE PRACTITIONER
Payer: MEDICAID

## 2024-07-09 DIAGNOSIS — R22.1 NECK MASS: ICD-10-CM

## 2024-07-09 DIAGNOSIS — R22.0 FACIAL MASS: ICD-10-CM

## 2024-07-09 PROCEDURE — 70492 CT SFT TSUE NCK W/O & W/DYE: CPT | Mod: TC

## 2024-07-09 PROCEDURE — 25500020 PHARM REV CODE 255

## 2024-07-09 RX ADMIN — IOHEXOL 100 ML: 350 INJECTION, SOLUTION INTRAVENOUS at 04:07

## 2024-07-17 ENCOUNTER — OFFICE VISIT (OUTPATIENT)
Dept: OTOLARYNGOLOGY | Facility: CLINIC | Age: 29
End: 2024-07-17
Payer: MEDICAID

## 2024-07-17 DIAGNOSIS — K11.8 PAROTID MASS: Primary | ICD-10-CM

## 2024-07-17 PROCEDURE — 99213 OFFICE O/P EST LOW 20 MIN: CPT | Mod: 95,,, | Performed by: NURSE PRACTITIONER

## 2024-07-17 PROCEDURE — 1159F MED LIST DOCD IN RCRD: CPT | Mod: CPTII,95,, | Performed by: NURSE PRACTITIONER

## 2024-07-17 NOTE — PROGRESS NOTES
Audio Only Telehealth Visit     The patient location is: state of [place]  The chief complaint leading to consultation is: f/u after CT  Visit type: Virtual visit with audio only (telephone)  Total time spent on visit: 20 min     The reason for the audio only service rather than synchronous audio and video virtual visit was related to technical difficulties or patient preference/necessity.     Each patient to whom I provide medical services by telemedicine is:  (1) informed of the relationship between the physician and patient and the respective role of any other health care provider with respect to management of the patient; and (2) notified that they may decline to receive medical services by telemedicine and may withdraw from such care at any time. Patient verbally consented to receive this service via voice-only telephone call.       HPI: 06/26/2024: 29 y.o. female referred for left ear pain, right facial mass. States she is no longer having any issues with her ear. She does have a mass to her right face which has been present for about 1.5 years and slowly growing over time. It is not painful & has never drained or become inflamed. She denies any dry mouth or bad taste in her mouth. She vapes nicotine daily. She is in an upcoming wedding this fall & anxious to have it removed.      07/17/2024: F/u after imaging. Expresses desire to book surgery as soon as she can.    Imaging:       Assessment and plan:  29 y.o. female with right facial/neck mass. CT with 4cm enhancing right parotid mass- reviewed with Dr. 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  - MRI neck  - IR referral for FNA  - RTC 3-4 wks to book surgery    Anamaria Leal NP        This service was not originating from a related E/M service provided within the previous 7 days nor will  to an E/M service or procedure within the next 24 hours or my soonest available appointment.  Prevailing standard of care was able to be met in this audio-only  visit.

## 2024-07-19 ENCOUNTER — TELEPHONE (OUTPATIENT)
Dept: INTERVENTIONAL RADIOLOGY/VASCULAR | Facility: HOSPITAL | Age: 29
End: 2024-07-19

## 2024-07-25 ENCOUNTER — HOSPITAL ENCOUNTER (OUTPATIENT)
Dept: INTERVENTIONAL RADIOLOGY/VASCULAR | Facility: HOSPITAL | Age: 29
Discharge: HOME | End: 2024-07-25
Attending: NURSE PRACTITIONER
Payer: MEDICAID

## 2024-07-25 DIAGNOSIS — K11.8 PAROTID MASS: ICD-10-CM

## 2024-07-25 PROCEDURE — 10005 FNA BX W/US GDN 1ST LES: CPT

## 2024-07-25 NOTE — DISCHARGE SUMMARY
Radiology Post-Procedure Note    Pre Op Diagnosis: Right Parotid Mass     Post Op Diagnosis: Same    Secondary Diagnoses:   Problem List Items Addressed This Visit    None  Visit Diagnoses       Parotid mass        Relevant Orders    IR FNA with Ultrasound             Procedure:  US Guided Right Parotid Biopsy    Procedure performed by: Yaya Christianson MD    Assistant: None    Written Informed Consent Obtained: Yes    Specimen Removed: FNA x 4 & 18g x 2    Estimated Blood Loss: <10 cc    Condition: Stable    Outcome: Patient tolerated treatment/procedure well without complication and is now ready for discharge.    Disposition: Home or Self Care    Follow Up: With primary care provider    Discharge Instructions:  No discharge procedures on file.       Time Spent On Discharge: 2 minutes

## 2024-07-25 NOTE — INTERVAL H&P NOTE
The patient has been examined and the H&P has been reviewed:    I concur with the findings and no changes have occurred since H&P was written. 30 yo F with right parotid mass presents for biopsy.        There are no hospital problems to display for this patient.

## 2024-08-02 ENCOUNTER — HOSPITAL ENCOUNTER (OUTPATIENT)
Dept: RADIOLOGY | Facility: HOSPITAL | Age: 29
Discharge: HOME | End: 2024-08-02
Attending: NURSE PRACTITIONER
Payer: MEDICAID

## 2024-08-02 DIAGNOSIS — K11.8 PAROTID MASS: ICD-10-CM

## 2024-08-02 LAB — B-HCG UR QL: NEGATIVE

## 2024-08-02 PROCEDURE — 81025 URINE PREGNANCY TEST: CPT | Performed by: NURSE PRACTITIONER

## 2024-08-02 NOTE — PROGRESS NOTES
MRI SOFT TISSUE NECK W/WO UNABLE TO BE COMPLETED DUE TO PATIENT BEING CLAUSTROPHOBIC. PATIENT CRYING ENTIRE TIME AND EVENTUALLY SQUEEZED EMERGENCY BALL STATING SHE COULD NOT DO THE TEST. PATIENT WAS INSTRUCTED TO CONTACT ORDERING PROVIDER ABOUT ANXIETY MEDICINE BEFORE RESCHEDULING.

## 2024-08-20 ENCOUNTER — HOSPITAL ENCOUNTER (OUTPATIENT)
Dept: RADIOLOGY | Facility: HOSPITAL | Age: 29
Discharge: HOME | End: 2024-08-20
Attending: NURSE PRACTITIONER
Payer: MEDICAID

## 2024-08-20 LAB — B-HCG UR QL: NEGATIVE

## 2024-08-20 PROCEDURE — 81025 URINE PREGNANCY TEST: CPT | Performed by: NURSE PRACTITIONER

## 2024-08-20 PROCEDURE — A9577 INJ MULTIHANCE: HCPCS

## 2024-08-20 PROCEDURE — 70543 MRI ORBT/FAC/NCK W/O &W/DYE: CPT | Mod: TC

## 2024-08-20 PROCEDURE — 25500020 PHARM REV CODE 255

## 2024-08-20 RX ADMIN — GADOBENATE DIMEGLUMINE 13 ML: 529 INJECTION, SOLUTION INTRAVENOUS at 10:08

## 2024-08-21 ENCOUNTER — OFFICE VISIT (OUTPATIENT)
Dept: OTOLARYNGOLOGY | Facility: CLINIC | Age: 29
End: 2024-08-21
Payer: MEDICAID

## 2024-08-21 VITALS
RESPIRATION RATE: 20 BRPM | DIASTOLIC BLOOD PRESSURE: 88 MMHG | BODY MASS INDEX: 22.33 KG/M2 | HEART RATE: 104 BPM | SYSTOLIC BLOOD PRESSURE: 123 MMHG | OXYGEN SATURATION: 100 % | TEMPERATURE: 98 F | HEIGHT: 65 IN | WEIGHT: 134 LBS

## 2024-08-21 DIAGNOSIS — K11.8 PAROTID MASS: Primary | ICD-10-CM

## 2024-08-21 PROCEDURE — 99214 OFFICE O/P EST MOD 30 MIN: CPT | Mod: PBBFAC | Performed by: STUDENT IN AN ORGANIZED HEALTH CARE EDUCATION/TRAINING PROGRAM

## 2024-08-21 RX ORDER — CEFAZOLIN SODIUM 2 G/50ML
2 SOLUTION INTRAVENOUS
OUTPATIENT
Start: 2024-08-21

## 2024-08-21 RX ORDER — SODIUM CHLORIDE 9 MG/ML
INJECTION, SOLUTION INTRAVENOUS CONTINUOUS
OUTPATIENT
Start: 2024-08-21

## 2024-08-21 NOTE — PROGRESS NOTES
[facility]  Otolaryngology Clinic Note    Sushma Tolentino  YOB: 1995    Chief Complaint:   Chief Complaint   Patient presents with    Follow-up     FNA results        HPI: 08/21/2024: 29 y.o. female referred for left ear pain, right facial mass. States she is no longer having any issues with her ear. She does have a mass to her right face which has been present for about 1.5 years and slowly growing over time. It is not painful & has never drained or become inflamed. She denies any dry mouth or bad taste in her mouth. She vapes nicotine daily. She is in an upcoming wedding this fall & anxious to have it removed.      8/21/24:  Here for follow-up.  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  Patient would like to have surgery.  MRI also performed    ROS:   10-point review of systems negative except per HPI      Review of patient's allergies indicates:  No Known Allergies    Past Medical History:   Diagnosis Date    ADHD     Anxiety disorder, unspecified     Hyperparathyroidism 2020    Hyperthyroidism     Ovarian cyst        Past Surgical History:   Procedure Laterality Date    LAPAROSCOPIC SALPINGO-OOPHORECTOMY Right 10/08/2023    Procedure: SALPINGO-OOPHORECTOMY, LAPAROSCOPIC;  Surgeon: Stanley Camacho MD;  Location: [facility];  Service: OB/GYN;  Laterality: Right;       Social History     Socioeconomic History    Marital status: Single   Tobacco Use    Smoking status: Every Day     Current packs/day: 0.50     Average packs/day: 0.5 packs/day for 4.0 years (2.0 ttl pk-yrs)     Types: Vaping with nicotine, Cigarettes    Smokeless tobacco: Current   Substance and Sexual Activity    Alcohol use: Yes     Alcohol/week: 3.0 standard drinks of alcohol     Types: 3 Glasses of wine per week    Drug use: Yes     Frequency: 14.0 times per week     Types: Amphetamines, Marijuana    Sexual activity: Yes     Partners: Male     Birth control/protection: None       Family History   Problem  "Relation Name Age of Onset    Hypothyroidism Mother bailey cerda     Thyroid disease Mother bailey cerda         hypothyroidism    No Known Problems Father      Hypothyroidism Sister      Diabetes Maternal Grandmother ishan quintero     Cancer Maternal Grandfather cosmo quintero         lung cancer       Outpatient Encounter Medications as of 2024   Medication Sig Dispense Refill    buPROPion (WELLBUTRIN XL) 150 MG TB24 tablet Take 150 mg by mouth every morning.      dextroamphetamine-amphetamine (ADDERALL) 20 mg tablet Take 1 tablet by mouth 3 (three) times daily.      clonazePAM (KLONOPIN) 0.5 MG disintegrating tablet Take 1 tablet (0.5 mg total) by mouth once. for 1 dose 1 tablet 0    [] gadobenate dimeglumine (MULTIHANCE) 529 mg/mL (0.1mmol/0.2mL) injection        No facility-administered encounter medications on file as of 2024.       Physical Exam:  Vitals:    24 1351   BP: 123/88   BP Location: Left arm   Patient Position: Sitting   BP Method: Medium (Automatic)   Pulse: 104   Resp: 20   Temp: 98.1 °F (36.7 °C)   TempSrc: Oral   SpO2: 100%   Weight: 60.8 kg (134 lb)   Height: 5' 5" (1.651 m)       Physical Exam   General: NAD, voice normal  Neuro: AAO, CN II - XII grossly intact  Head/ Face: NCAT, symmetric, sensations intact bilaterally. Roughly 4cm mass to right preauricular area which is firm and fairly mobile, nontender. There is not a visible pore associated with the mass.    Eyes: EOMI, PERRL  Ears: externally normal with grossly normal hearing  AD: EAC patent, TM intact, no middle ear effusion, no retractions  AS: EAC patent, TM intact, no middle ear effusion, no retractions  Nose: bilateral nares patent, midline septum, no rhinorrhea, no external deformity, no turbinate hypertrophy  OC/OP: MMM, no intraoral lesions, FOM/BOT soft, no trismus, dentition is moderate, no uvular deviation, bilaterally symmetric soft palate elevation, palatoglossus and palatopharyngeal fold wnl; tonsils " are symmetric and 1+  Indirect laryngoscopy: deferred due to patient intolerance  Neck: soft, supple, no LAD, normal ROM, no thyromegaly. B/l parotid & SMD glands normal to palpation  Respiratory: nonlabored, no wheezing, bilateral chest rise  Cardiovascular: RRR  Gastrointestinal: S NT ND  Skin: warm, no lesions  Musculoskeletal: 5/5 strength  Psych: Appropriate affect/mood     Pertinent Data:  ? LABS:  ? AUDIO:           ? PATH:      Imaging:   I personally reviewed the following images:  3.9 cm x 3.5 cm x 2.2 cm well-circumscribed avidly enhancing mass located in the superficial aspect of the right parotid gland.  Imaging cannot reliably differentiate between benign and malignant parotid lesions, however, given the signal characteristics, favor a benign mixed tumor.  Recommend image guided tissue sampling.     There are multiple small mucous retention cysts in the maxillary sinuses bilaterally.      Assessment/Plan:  29 y.o. female with right parotid mass.  FNA revealed fibrous solitary tumor.  Facial nerve function intact    - Scheduled right parotidectomy with facial nerve dissection on 9/20.   - Return to clinic in 1 week after surgery    SYLWIA Gillette MD  [facility] Department of Otolaryngology  [facility]

## 2024-08-21 NOTE — H&P (VIEW-ONLY)
[facility]  Otolaryngology Clinic Note    Sushma Tolentino  YOB: 1995    Chief Complaint:   Chief Complaint   Patient presents with    Follow-up     FNA results        HPI: 08/21/2024: 29 y.o. female referred for left ear pain, right facial mass. States she is no longer having any issues with her ear. She does have a mass to her right face which has been present for about 1.5 years and slowly growing over time. It is not painful & has never drained or become inflamed. She denies any dry mouth or bad taste in her mouth. She vapes nicotine daily. She is in an upcoming wedding this fall & anxious to have it removed.      8/21/24:  Here for follow-up.  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  Patient would like to have surgery.  MRI also performed    ROS:   10-point review of systems negative except per HPI      Review of patient's allergies indicates:  No Known Allergies    Past Medical History:   Diagnosis Date    ADHD     Anxiety disorder, unspecified     Hyperparathyroidism 2020    Hyperthyroidism     Ovarian cyst        Past Surgical History:   Procedure Laterality Date    LAPAROSCOPIC SALPINGO-OOPHORECTOMY Right 10/08/2023    Procedure: SALPINGO-OOPHORECTOMY, LAPAROSCOPIC;  Surgeon: Stanley Camacho MD;  Location: [facility];  Service: OB/GYN;  Laterality: Right;       Social History     Socioeconomic History    Marital status: Single   Tobacco Use    Smoking status: Every Day     Current packs/day: 0.50     Average packs/day: 0.5 packs/day for 4.0 years (2.0 ttl pk-yrs)     Types: Vaping with nicotine, Cigarettes    Smokeless tobacco: Current   Substance and Sexual Activity    Alcohol use: Yes     Alcohol/week: 3.0 standard drinks of alcohol     Types: 3 Glasses of wine per week    Drug use: Yes     Frequency: 14.0 times per week     Types: Amphetamines, Marijuana    Sexual activity: Yes     Partners: Male     Birth control/protection: None       Family History   Problem  "Relation Name Age of Onset    Hypothyroidism Mother bailey cerda     Thyroid disease Mother bailey cerda         hypothyroidism    No Known Problems Father      Hypothyroidism Sister      Diabetes Maternal Grandmother ishan quintero     Cancer Maternal Grandfather cosmo quintero         lung cancer       Outpatient Encounter Medications as of 2024   Medication Sig Dispense Refill    buPROPion (WELLBUTRIN XL) 150 MG TB24 tablet Take 150 mg by mouth every morning.      dextroamphetamine-amphetamine (ADDERALL) 20 mg tablet Take 1 tablet by mouth 3 (three) times daily.      clonazePAM (KLONOPIN) 0.5 MG disintegrating tablet Take 1 tablet (0.5 mg total) by mouth once. for 1 dose 1 tablet 0    [] gadobenate dimeglumine (MULTIHANCE) 529 mg/mL (0.1mmol/0.2mL) injection        No facility-administered encounter medications on file as of 2024.       Physical Exam:  Vitals:    24 1351   BP: 123/88   BP Location: Left arm   Patient Position: Sitting   BP Method: Medium (Automatic)   Pulse: 104   Resp: 20   Temp: 98.1 °F (36.7 °C)   TempSrc: Oral   SpO2: 100%   Weight: 60.8 kg (134 lb)   Height: 5' 5" (1.651 m)       Physical Exam   General: NAD, voice normal  Neuro: AAO, CN II - XII grossly intact  Head/ Face: NCAT, symmetric, sensations intact bilaterally. Roughly 4cm mass to right preauricular area which is firm and fairly mobile, nontender. There is not a visible pore associated with the mass.    Eyes: EOMI, PERRL  Ears: externally normal with grossly normal hearing  AD: EAC patent, TM intact, no middle ear effusion, no retractions  AS: EAC patent, TM intact, no middle ear effusion, no retractions  Nose: bilateral nares patent, midline septum, no rhinorrhea, no external deformity, no turbinate hypertrophy  OC/OP: MMM, no intraoral lesions, FOM/BOT soft, no trismus, dentition is moderate, no uvular deviation, bilaterally symmetric soft palate elevation, palatoglossus and palatopharyngeal fold wnl; tonsils " are symmetric and 1+  Indirect laryngoscopy: deferred due to patient intolerance  Neck: soft, supple, no LAD, normal ROM, no thyromegaly. B/l parotid & SMD glands normal to palpation  Respiratory: nonlabored, no wheezing, bilateral chest rise  Cardiovascular: RRR  Gastrointestinal: S NT ND  Skin: warm, no lesions  Musculoskeletal: 5/5 strength  Psych: Appropriate affect/mood     Pertinent Data:  ? LABS:  ? AUDIO:           ? PATH:      Imaging:   I personally reviewed the following images:  3.9 cm x 3.5 cm x 2.2 cm well-circumscribed avidly enhancing mass located in the superficial aspect of the right parotid gland.  Imaging cannot reliably differentiate between benign and malignant parotid lesions, however, given the signal characteristics, favor a benign mixed tumor.  Recommend image guided tissue sampling.     There are multiple small mucous retention cysts in the maxillary sinuses bilaterally.      Assessment/Plan:  29 y.o. female with right parotid mass.  FNA revealed fibrous solitary tumor.  Facial nerve function intact    - Scheduled right parotidectomy with facial nerve dissection on 9/20.   - Return to clinic in 1 week after surgery    SYLWIA Gillette MD  [facility] Department of Otolaryngology  \[facility]\""

## 2024-09-05 ENCOUNTER — ANESTHESIA EVENT (OUTPATIENT)
Dept: SURGERY | Facility: HOSPITAL | Age: 29
End: 2024-09-05
Payer: MEDICAID

## 2024-09-05 NOTE — ANESTHESIA PREPROCEDURE EVALUATION
Sushma Tolentino is a 29 y.o. female presenting for EXCISION, PAROTID GLAND (Right: Face)  with a history of   -R PAROTID MASS  -ADHD/ANXIETY  -HYPERTHYROIDISM/HYPERPARATHYROIDISM (Pt states PCP took her off of meds because she no longer has thyroid issues)  -OVARIAN CYST/miscarried 9/2023  -SMOKES/VAPES  -MARIJUANA USER    BETA-BLOCKER: NONE    New Orders for Anesthesia: UPT    Patient Active Problem List   Diagnosis    Pregnancy of unknown anatomic location    Vaginal bleeding affecting early pregnancy    Abdominal pain complicating pregnancy    History of ovarian cyst    Uterine fibroid during pregnancy, antepartum    Abnormal pregnancy in first trimester    Ruptured ectopic pregnancy    Post-operative state     Pre-op Assessment    I have reviewed the NPO Status.      Review of Systems  Anesthesia Hx:  No problems with previous Anesthesia                Social:  Smoker, Vaping, Recreational Drugs       Cardiovascular:  Cardiovascular Normal                                            Pulmonary:  Pulmonary Normal                       Renal/:  Renal/ Normal                 Hepatic/GI:  Hepatic/GI Normal                 Neurological:  Neurology Normal                                      Endocrine:    Hyperthyroidism           Vitals:    09/20/24 0503 09/20/24 0530 09/20/24 0546 09/20/24 0621   BP:  121/85 121/85 122/78   Pulse:  95  98   Resp:    20   Temp:  36.7 °C (98 °F)  36 °C (96.8 °F)   TempSrc:  Oral  Temporal   SpO2:  99%  100%   Weight: 57.1 kg (125 lb 12.8 oz)            Physical Exam  General: Alert, Cooperative and Well nourished    Airway:  Mallampati: II   Mouth Opening: Normal  TM Distance: Normal  Tongue: Normal  Neck ROM: Normal ROM    Dental:  Intact    Chest/Lungs:  Clear to auscultation, Normal Respiratory Rate    Heart:  Rate: Normal  Rhythm: Regular Rhythm  Sounds: Normal       Latest Reference Range & Units 09/20/24 05:38   hCG Qualitative, Urine Negative  Negative     Lab Results    Component Value Date    WBC 10.30 10/09/2023    HGB 8.3 (L) 10/09/2023    HCT 23.7 (L) 10/09/2023    MCV 94.0 10/09/2023     10/09/2023       CMP  Sodium   Date Value Ref Range Status   10/08/2023 139 136 - 145 mmol/L Final     Potassium   Date Value Ref Range Status   10/08/2023 4.9 3.5 - 5.1 mmol/L Final     Chloride   Date Value Ref Range Status   10/08/2023 112 (H) 98 - 107 mmol/L Final     CO2   Date Value Ref Range Status   10/08/2023 16 (L) 22 - 29 mmol/L Final     Blood Urea Nitrogen   Date Value Ref Range Status   10/08/2023 9.7 7.0 - 18.7 mg/dL Final     Creatinine   Date Value Ref Range Status   10/08/2023 0.68 0.55 - 1.02 mg/dL Final     Calcium   Date Value Ref Range Status   10/08/2023 9.5 8.4 - 10.2 mg/dL Final     Albumin   Date Value Ref Range Status   10/08/2023 4.6 3.5 - 5.0 g/dL Final     Bilirubin Total   Date Value Ref Range Status   10/08/2023 0.4 <=1.5 mg/dL Final     ALP   Date Value Ref Range Status   10/08/2023 50 40 - 150 unit/L Final     AST   Date Value Ref Range Status   10/08/2023 16 5 - 34 unit/L Final     ALT   Date Value Ref Range Status   10/08/2023 12 0 - 55 unit/L Final     eGFR   Date Value Ref Range Status   10/08/2023 >60 mls/min/1.73/m2 Final              Anesthesia Plan  Type of Anesthesia, risks & benefits discussed:    Anesthesia Type: Gen ETT  Intra-op Monitoring Plan: Standard ASA Monitors  Post Op Pain Control Plan: IV/PO Opioids PRN  Induction:  IV  Airway Plan: Direct  Informed Consent: Informed consent signed with the Patient and all parties understand the risks and agree with anesthesia plan.  All questions answered.   ASA Score: 2  Day of Surgery Review of History & Physical: H&P Update referred to the surgeon/provider.    Ready For Surgery From Anesthesia Perspective.     .

## 2024-09-19 ENCOUNTER — PATIENT MESSAGE (OUTPATIENT)
Dept: SURGERY | Facility: HOSPITAL | Age: 29
End: 2024-09-19
Payer: MEDICAID

## 2024-09-20 ENCOUNTER — HOSPITAL ENCOUNTER (OUTPATIENT)
Facility: HOSPITAL | Age: 29
Discharge: HOME | End: 2024-09-20
Attending: OTOLARYNGOLOGY | Admitting: OTOLARYNGOLOGY
Payer: MEDICAID

## 2024-09-20 ENCOUNTER — ANESTHESIA (OUTPATIENT)
Dept: SURGERY | Facility: HOSPITAL | Age: 29
End: 2024-09-20
Payer: MEDICAID

## 2024-09-20 VITALS
WEIGHT: 125.81 LBS | TEMPERATURE: 99 F | RESPIRATION RATE: 16 BRPM | HEART RATE: 93 BPM | SYSTOLIC BLOOD PRESSURE: 113 MMHG | OXYGEN SATURATION: 98 % | BODY MASS INDEX: 20.93 KG/M2 | DIASTOLIC BLOOD PRESSURE: 70 MMHG

## 2024-09-20 DIAGNOSIS — K11.8 PAROTID MASS: Primary | ICD-10-CM

## 2024-09-20 LAB
B-HCG UR QL: NEGATIVE
CTP QC/QA: YES

## 2024-09-20 PROCEDURE — 71000033 HC RECOVERY, INTIAL HOUR: Performed by: OTOLARYNGOLOGY

## 2024-09-20 PROCEDURE — 63600175 PHARM REV CODE 636 W HCPCS: Performed by: ANESTHESIOLOGY

## 2024-09-20 PROCEDURE — 71000015 HC POSTOP RECOV 1ST HR: Performed by: OTOLARYNGOLOGY

## 2024-09-20 PROCEDURE — 88307 TISSUE EXAM BY PATHOLOGIST: CPT | Mod: TC | Performed by: OTOLARYNGOLOGY

## 2024-09-20 PROCEDURE — 37000009 HC ANESTHESIA EA ADD 15 MINS: Performed by: OTOLARYNGOLOGY

## 2024-09-20 PROCEDURE — 37000008 HC ANESTHESIA 1ST 15 MINUTES: Performed by: OTOLARYNGOLOGY

## 2024-09-20 PROCEDURE — 27201423 OPTIME MED/SURG SUP & DEVICES STERILE SUPPLY: Performed by: OTOLARYNGOLOGY

## 2024-09-20 PROCEDURE — 71000016 HC POSTOP RECOV ADDL HR: Performed by: OTOLARYNGOLOGY

## 2024-09-20 PROCEDURE — 63600175 PHARM REV CODE 636 W HCPCS: Performed by: NURSE ANESTHETIST, CERTIFIED REGISTERED

## 2024-09-20 PROCEDURE — 36000708 HC OR TIME LEV III 1ST 15 MIN: Performed by: OTOLARYNGOLOGY

## 2024-09-20 PROCEDURE — 25000003 PHARM REV CODE 250: Performed by: NURSE ANESTHETIST, CERTIFIED REGISTERED

## 2024-09-20 PROCEDURE — 36000709 HC OR TIME LEV III EA ADD 15 MIN: Performed by: OTOLARYNGOLOGY

## 2024-09-20 PROCEDURE — 25000003 PHARM REV CODE 250: Performed by: OTOLARYNGOLOGY

## 2024-09-20 PROCEDURE — 81025 URINE PREGNANCY TEST: CPT | Performed by: NURSE PRACTITIONER

## 2024-09-20 PROCEDURE — 63600175 PHARM REV CODE 636 W HCPCS: Performed by: STUDENT IN AN ORGANIZED HEALTH CARE EDUCATION/TRAINING PROGRAM

## 2024-09-20 RX ORDER — SUCCINYLCHOLINE CHLORIDE 20 MG/ML
INJECTION INTRAMUSCULAR; INTRAVENOUS
Status: DISCONTINUED | OUTPATIENT
Start: 2024-09-20 | End: 2024-09-20

## 2024-09-20 RX ORDER — ROCURONIUM BROMIDE 10 MG/ML
INJECTION, SOLUTION INTRAVENOUS
Status: DISCONTINUED | OUTPATIENT
Start: 2024-09-20 | End: 2024-09-20

## 2024-09-20 RX ORDER — OXYCODONE HYDROCHLORIDE 5 MG/1
5 TABLET ORAL EVERY 6 HOURS PRN
Qty: 16 TABLET | Refills: 0 | Status: SHIPPED | OUTPATIENT
Start: 2024-09-20

## 2024-09-20 RX ORDER — SODIUM CHLORIDE 9 MG/ML
INJECTION, SOLUTION INTRAVENOUS CONTINUOUS
Status: DISCONTINUED | OUTPATIENT
Start: 2024-09-20 | End: 2024-09-20 | Stop reason: HOSPADM

## 2024-09-20 RX ORDER — SODIUM CHLORIDE, SODIUM LACTATE, POTASSIUM CHLORIDE, CALCIUM CHLORIDE 600; 310; 30; 20 MG/100ML; MG/100ML; MG/100ML; MG/100ML
INJECTION, SOLUTION INTRAVENOUS CONTINUOUS
Status: DISCONTINUED | OUTPATIENT
Start: 2024-09-20 | End: 2024-09-20 | Stop reason: HOSPADM

## 2024-09-20 RX ORDER — ONDANSETRON HYDROCHLORIDE 2 MG/ML
INJECTION, SOLUTION INTRAVENOUS
Status: DISCONTINUED | OUTPATIENT
Start: 2024-09-20 | End: 2024-09-20

## 2024-09-20 RX ORDER — HYDROMORPHONE HYDROCHLORIDE 1 MG/ML
INJECTION, SOLUTION INTRAMUSCULAR; INTRAVENOUS; SUBCUTANEOUS
Status: DISCONTINUED | OUTPATIENT
Start: 2024-09-20 | End: 2024-09-20

## 2024-09-20 RX ORDER — MEPERIDINE HYDROCHLORIDE 25 MG/ML
12.5 INJECTION INTRAMUSCULAR; INTRAVENOUS; SUBCUTANEOUS EVERY 10 MIN PRN
Status: DISCONTINUED | OUTPATIENT
Start: 2024-09-20 | End: 2024-09-20 | Stop reason: HOSPADM

## 2024-09-20 RX ORDER — LIDOCAINE HYDROCHLORIDE 20 MG/ML
INJECTION INTRAVENOUS
Status: DISCONTINUED | OUTPATIENT
Start: 2024-09-20 | End: 2024-09-20

## 2024-09-20 RX ORDER — GLUCAGON 1 MG
1 KIT INJECTION
Status: DISCONTINUED | OUTPATIENT
Start: 2024-09-20 | End: 2024-09-20 | Stop reason: HOSPADM

## 2024-09-20 RX ORDER — AMOXICILLIN AND CLAVULANATE POTASSIUM 875; 125 MG/1; MG/1
1 TABLET, FILM COATED ORAL EVERY 12 HOURS
Qty: 10 TABLET | Refills: 0 | Status: SHIPPED | OUTPATIENT
Start: 2024-09-20 | End: 2024-09-25

## 2024-09-20 RX ORDER — KETAMINE HCL IN 0.9 % NACL 50 MG/5 ML
SYRINGE (ML) INTRAVENOUS
Status: DISCONTINUED | OUTPATIENT
Start: 2024-09-20 | End: 2024-09-20

## 2024-09-20 RX ORDER — OXYMETAZOLINE HCL 0.05 %
SPRAY, NON-AEROSOL (ML) NASAL
Status: DISCONTINUED | OUTPATIENT
Start: 2024-09-20 | End: 2024-09-20 | Stop reason: HOSPADM

## 2024-09-20 RX ORDER — PROMETHAZINE HYDROCHLORIDE 25 MG/ML
6.25 INJECTION, SOLUTION INTRAMUSCULAR; INTRAVENOUS ONCE
Status: COMPLETED | OUTPATIENT
Start: 2024-09-20 | End: 2024-09-20

## 2024-09-20 RX ORDER — FENTANYL CITRATE 50 UG/ML
INJECTION, SOLUTION INTRAMUSCULAR; INTRAVENOUS
Status: DISCONTINUED | OUTPATIENT
Start: 2024-09-20 | End: 2024-09-20

## 2024-09-20 RX ORDER — MIDAZOLAM HYDROCHLORIDE 2 MG/2ML
2 INJECTION, SOLUTION INTRAMUSCULAR; INTRAVENOUS ONCE AS NEEDED
Status: COMPLETED | OUTPATIENT
Start: 2024-09-20 | End: 2024-09-20

## 2024-09-20 RX ORDER — CEFAZOLIN SODIUM 1 G/3ML
2 INJECTION, POWDER, FOR SOLUTION INTRAMUSCULAR; INTRAVENOUS
Status: COMPLETED | OUTPATIENT
Start: 2024-09-20 | End: 2024-09-20

## 2024-09-20 RX ORDER — KETOROLAC TROMETHAMINE 30 MG/ML
INJECTION, SOLUTION INTRAMUSCULAR; INTRAVENOUS
Status: DISCONTINUED | OUTPATIENT
Start: 2024-09-20 | End: 2024-09-20

## 2024-09-20 RX ORDER — DEXAMETHASONE SODIUM PHOSPHATE 4 MG/ML
INJECTION, SOLUTION INTRA-ARTICULAR; INTRALESIONAL; INTRAMUSCULAR; INTRAVENOUS; SOFT TISSUE
Status: DISCONTINUED | OUTPATIENT
Start: 2024-09-20 | End: 2024-09-20

## 2024-09-20 RX ORDER — ONDANSETRON 4 MG/1
4 TABLET, FILM COATED ORAL EVERY 8 HOURS PRN
Qty: 10 TABLET | Refills: 0 | Status: SHIPPED | OUTPATIENT
Start: 2024-09-20

## 2024-09-20 RX ORDER — ONDANSETRON HYDROCHLORIDE 2 MG/ML
4 INJECTION, SOLUTION INTRAVENOUS DAILY PRN
Status: DISCONTINUED | OUTPATIENT
Start: 2024-09-20 | End: 2024-09-20 | Stop reason: HOSPADM

## 2024-09-20 RX ORDER — HYDROMORPHONE HYDROCHLORIDE 1 MG/ML
0.5 INJECTION, SOLUTION INTRAMUSCULAR; INTRAVENOUS; SUBCUTANEOUS EVERY 5 MIN PRN
Status: DISCONTINUED | OUTPATIENT
Start: 2024-09-20 | End: 2024-09-20 | Stop reason: HOSPADM

## 2024-09-20 RX ORDER — OXYCODONE HYDROCHLORIDE 5 MG/1
5 TABLET ORAL
Status: DISCONTINUED | OUTPATIENT
Start: 2024-09-20 | End: 2024-09-20 | Stop reason: HOSPADM

## 2024-09-20 RX ORDER — SODIUM CHLORIDE 0.9 % (FLUSH) 0.9 %
10 SYRINGE (ML) INJECTION
Status: DISCONTINUED | OUTPATIENT
Start: 2024-09-20 | End: 2024-09-20 | Stop reason: HOSPADM

## 2024-09-20 RX ORDER — MORPHINE SULFATE 2 MG/ML
2 INJECTION, SOLUTION INTRAMUSCULAR; INTRAVENOUS EVERY 5 MIN PRN
Status: COMPLETED | OUTPATIENT
Start: 2024-09-20 | End: 2024-09-20

## 2024-09-20 RX ORDER — PROPOFOL 10 MG/ML
VIAL (ML) INTRAVENOUS
Status: DISCONTINUED | OUTPATIENT
Start: 2024-09-20 | End: 2024-09-20

## 2024-09-20 RX ADMIN — SODIUM CHLORIDE 0.05 MCG/KG/MIN: 9 INJECTION, SOLUTION INTRAVENOUS at 07:09

## 2024-09-20 RX ADMIN — LIDOCAINE HYDROCHLORIDE 80 MG: 20 INJECTION INTRAVENOUS at 07:09

## 2024-09-20 RX ADMIN — CEFAZOLIN 2 G: 330 INJECTION, POWDER, FOR SOLUTION INTRAMUSCULAR; INTRAVENOUS at 11:09

## 2024-09-20 RX ADMIN — MORPHINE SULFATE 2 MG: 2 INJECTION, SOLUTION INTRAMUSCULAR; INTRAVENOUS at 01:09

## 2024-09-20 RX ADMIN — PROPOFOL 100 MG: 10 INJECTION, EMULSION INTRAVENOUS at 12:09

## 2024-09-20 RX ADMIN — ROCURONIUM BROMIDE 5 MG: 10 INJECTION INTRAVENOUS at 07:09

## 2024-09-20 RX ADMIN — Medication 25 MG: at 07:09

## 2024-09-20 RX ADMIN — PROPOFOL 200 MG: 10 INJECTION, EMULSION INTRAVENOUS at 07:09

## 2024-09-20 RX ADMIN — MIDAZOLAM HYDROCHLORIDE 2 MG: 1 INJECTION, SOLUTION INTRAMUSCULAR; INTRAVENOUS at 06:09

## 2024-09-20 RX ADMIN — PROMETHAZINE HYDROCHLORIDE 6.25 MG: 25 INJECTION INTRAMUSCULAR; INTRAVENOUS at 01:09

## 2024-09-20 RX ADMIN — SODIUM CHLORIDE, POTASSIUM CHLORIDE, SODIUM LACTATE AND CALCIUM CHLORIDE: 600; 310; 30; 20 INJECTION, SOLUTION INTRAVENOUS at 06:09

## 2024-09-20 RX ADMIN — Medication 25 MG: at 09:09

## 2024-09-20 RX ADMIN — DEXAMETHASONE SODIUM PHOSPHATE 8 MG: 4 INJECTION, SOLUTION INTRA-ARTICULAR; INTRALESIONAL; INTRAMUSCULAR; INTRAVENOUS; SOFT TISSUE at 07:09

## 2024-09-20 RX ADMIN — KETOROLAC TROMETHAMINE 30 MG: 30 INJECTION, SOLUTION INTRAMUSCULAR at 11:09

## 2024-09-20 RX ADMIN — HYDROMORPHONE HYDROCHLORIDE 0.5 MG: 1 INJECTION, SOLUTION INTRAMUSCULAR; INTRAVENOUS; SUBCUTANEOUS at 12:09

## 2024-09-20 RX ADMIN — SUCCINYLCHOLINE CHLORIDE 100 MG: 20 INJECTION, SOLUTION INTRAMUSCULAR; INTRAVENOUS; PARENTERAL at 07:09

## 2024-09-20 RX ADMIN — FENTANYL CITRATE 100 MCG: 50 INJECTION INTRAMUSCULAR; INTRAVENOUS at 07:09

## 2024-09-20 RX ADMIN — MEPERIDINE HYDROCHLORIDE 12.5 MG: 25 INJECTION INTRAMUSCULAR; INTRAVENOUS; SUBCUTANEOUS at 01:09

## 2024-09-20 RX ADMIN — CEFAZOLIN 2 G: 330 INJECTION, POWDER, FOR SOLUTION INTRAMUSCULAR; INTRAVENOUS at 07:09

## 2024-09-20 RX ADMIN — HYDROMORPHONE HYDROCHLORIDE 0.5 MG: 1 INJECTION, SOLUTION INTRAMUSCULAR; INTRAVENOUS; SUBCUTANEOUS at 01:09

## 2024-09-20 RX ADMIN — ONDANSETRON 4 MG: 2 INJECTION INTRAMUSCULAR; INTRAVENOUS at 11:09

## 2024-09-20 NOTE — DISCHARGE INSTRUCTIONS
Post-op Pain Management    Take Acetaminophen (Tylenol) 1000 mg every 8 hours on a schedule for the first 48 hours  Take Ibuprofen (Motrin, Aleeve) 600-800 mg every 6-8 hours on a schedule for the first 48 hours  OK to take Oxycodone for persistent, intolerable pain after Tylenol and Ibuprofen are taken    After 2 days, OK to continue scheduled Tylenol and Ibuprofen up to day 5, or switch to every 8 hours as needed.     It is important to get acetaminophen (Tylenol) in addition to ibuprofen. These medications work together to control pain, and are less effective alone.      Ok to shower after 3 days. Do not submerge neck    Change fluff dressing as needed around the drain.    If you have facial weakness this is temporary after parotidectomy. If you are unable to close your eye completely call the ENT on call and start artificial tear eye drops during the day as well as eye lubricating ointment to be used at night.

## 2024-09-20 NOTE — OP NOTE
[facility] Otolaryngology-Head & Neck Surgery  Operative Report    Patient: Sushma Tolentino  : 1995 29 y.o.  MRN: [id number]  Date: 2024    Pre-op Diagnosis:      * Parotid mass [K11.8]       Post-op Diagnosis:   Post-Op Diagnosis Codes:     * Parotid mass [K11.8]    Procedures:  Procedure(s):  EXCISION, PAROTID GLAND    Surgeon(s):  Emir Cole MD Petitjean, Mark C, MD    Resident Surgeon(s):   TOMMIE Hernandez MD    Anesthesia: General    Staff:   Circulator: Jaziel Ramsey RN; Mei Ferrera RN  Relief Scrub: Sabas Castillo ST  Scrub Person: Nathaly Charlton ST    Estimated Blood Loss:  20-30cc                 Specimens:   Specimens (From admission, onward)       Start     Ordered    24 1135  Specimen to Pathology  RELEASE UPON ORDERING        References:    Click here for ordering Quick Tip   Question:  Release to patient  Answer:  Immediate    24 1135                           Drains: R face penrose              Condition: Good    Disposition: PACU - hemodynamically stable.      Findings:  Right superficial parotid mass  Facial nerve stimulated in all divisions at the end of the procedure      Procedure Narrative:  The risks, benefits, and alternatives of the procedure were discussed and informed written consent was obtained. The patient was brought to the operating room and placed on the table in the supine position. General endotracheal anesthesia was induced. Facial nerve monitoring probes were placed into the orbicularis oris and orbicularis oculi with appropriate stimulation. Modified aureliano incision was drawn and injected with 1% lidocaine 1:200,000 epinephrine. A proper time-out was performed and confirmed to be correct.     Patient was prepped and draped in sterile fashion.  Nim monitor was noted to be correct prior to incision.  Fifteen blade scalpel was used to incise the modified Aureliano incision.  A subplatysmal and sub cm mass plane was elevated anteriorly.  Pre tragal  tunnel was bluntly dissected.  The anterior aspect of the SCM was identified and skeletonized.  The great auricular nerve was identified and skeletonized.  The posterior belly of the digastric was identified and skeletonized posteriorly.  The tissue intervening tissue between the the posterior belly digastric in the tragal tunnel was bluntly dissected and taken down with Bovie electrocautery.  The tympanomastoid suture line was identified.  The facial nerve was identified exiting the stylomastoid foramen within the tympanomastoid suture line and stimulated appropriately in all divisions.  Facial nerve was dissected distally and the intervening parotid tissue was taken down from an inferior to superior fashion. The tumor was then removed from the parotid bed.  The nerve was stimulated again and all divisions were noted to be working appropriately.  Hemostasis achieved with bipolar. The wound was irrigated with sterile saline. Valsalva was performed and the patient was noted to be hemostatic. 3-0 vicryl was used to reapproximate the remaining parotid tissue.  Penrose drain was placed in the wound bed an additional incision was placed in the posterior hairline.  Penrose was sutured in position with 3-0 silk.  3-0 Vicryl was used to reapproximate the skin in the deep dermal fashion.  Skin was closed with 4-0 plain gut in a simple running fashion.  Skin was cleaned and Mastisol and Steri-Strips were placed over the incision site.    The procedure was then completed. She was returned to the care of anesthesia for reversal and awakening, which occurred without issue.  She was taken to the PACU in the stable and awake condition.     Dr. Cole was present and scrubbed for all key portions of the procedure.

## 2024-09-20 NOTE — TRANSFER OF CARE
Anesthesia Transfer of Care Note    Patient: Sushma Tolentino    Procedure(s) Performed: Procedure(s) (LRB):  EXCISION, PAROTID GLAND (Right)    Patient location: PACU    Anesthesia Type: general    Transport from OR: Transported from OR on room air with adequate spontaneous ventilation    Post pain: adequate analgesia    Post assessment: no apparent anesthetic complications and tolerated procedure well    Post vital signs: stable    Level of consciousness: sedated    Nausea/Vomiting: no nausea/vomiting    Complications: none    Transfer of care protocol was followed

## 2024-09-20 NOTE — ANESTHESIA POSTPROCEDURE EVALUATION
Anesthesia Post Evaluation    Patient: Sushma Tolentino    Procedure(s) Performed: Procedure(s) (LRB):  EXCISION, PAROTID GLAND (Right)    Final Anesthesia Type: general      Patient location during evaluation: DOSC  Post-procedure vital signs: reviewed and stable  Airway patency: patent      Anesthetic complications: no      Cardiovascular status: hemodynamically stable  Respiratory status: spontaneous ventilation  Follow-up not needed.              Vitals Value Taken Time   /61 09/20/24 1346   Temp 37.1 °C (98.8 °F) 09/20/24 1344   Pulse 100 09/20/24 1348   Resp 16 09/20/24 1344   SpO2 98 % 09/20/24 1348   Vitals shown include unfiled device data.      Event Time   Out of Recovery 13:40:00         Pain/Gael Score: Pain Rating Prior to Med Admin: 5 (9/20/2024  1:44 PM)  Gael Score: 10 (9/20/2024  1:40 PM)

## 2024-09-20 NOTE — ANESTHESIA PROCEDURE NOTES
Intubation    Date/Time: 9/20/2024 7:15 AM    Performed by: Margo Rodriguez CRNA  Authorized by: Margo Rodriguez CRNA    Intubation:     Induction:  Intravenous    Intubated:  Postinduction    Mask Ventilation:  Easy mask    Attempts:  1    Attempted By:  Student (Nicole Wade, 3rd yr med student)    Method of Intubation:  Direct    Blade:  Clarice 3    Laryngeal View Grade: Grade I - full view of cords      Difficult Airway Encountered?: No      Complications:  None    Airway Device:  Oral endotracheal tube    Airway Device Size:  7.0    Style/Cuff Inflation:  Cuffed (inflated to minimal occlusive pressure)    Inflation Amount (mL):  6    Tube secured:  21    Secured at:  The lips    Placement Verified By:  Capnometry    Complicating Factors:  None    Findings Post-Intubation:  BS equal bilateral and atraumatic/condition of teeth unchanged

## 2024-09-24 ENCOUNTER — OFFICE VISIT (OUTPATIENT)
Dept: OTOLARYNGOLOGY | Facility: CLINIC | Age: 29
End: 2024-09-24
Payer: MEDICAID

## 2024-09-24 VITALS
SYSTOLIC BLOOD PRESSURE: 130 MMHG | HEART RATE: 77 BPM | WEIGHT: 124.81 LBS | DIASTOLIC BLOOD PRESSURE: 86 MMHG | HEIGHT: 65 IN | TEMPERATURE: 98 F | RESPIRATION RATE: 18 BRPM | BODY MASS INDEX: 20.79 KG/M2

## 2024-09-24 DIAGNOSIS — Z98.890 POST-OPERATIVE STATE: ICD-10-CM

## 2024-09-24 DIAGNOSIS — Z98.890 H/O SUPERFICIAL PAROTIDECTOMY: Primary | ICD-10-CM

## 2024-09-24 DIAGNOSIS — K11.8 PAROTID MASS: ICD-10-CM

## 2024-09-24 PROCEDURE — 99213 OFFICE O/P EST LOW 20 MIN: CPT | Mod: PBBFAC | Performed by: STUDENT IN AN ORGANIZED HEALTH CARE EDUCATION/TRAINING PROGRAM

## 2024-09-25 LAB
DHEA SERPL-MCNC: NORMAL
ESTROGEN SERPL-MCNC: NORMAL PG/ML
INSULIN SERPL-ACNC: NORMAL U[IU]/ML
LAB AP CLINICAL INFORMATION: NORMAL
LAB AP GROSS DESCRIPTION: NORMAL
LAB AP REPORT FOOTNOTES: NORMAL
T3RU NFR SERPL: NORMAL %

## 2024-09-25 NOTE — PROGRESS NOTES
I have reviewed and agree with the resident's findings, including all diagnostic interpretations and plans as written.     Emir Cole M.D.

## 2024-10-01 ENCOUNTER — OFFICE VISIT (OUTPATIENT)
Dept: OTOLARYNGOLOGY | Facility: CLINIC | Age: 29
End: 2024-10-01
Payer: MEDICAID

## 2024-10-01 VITALS
SYSTOLIC BLOOD PRESSURE: 118 MMHG | DIASTOLIC BLOOD PRESSURE: 72 MMHG | BODY MASS INDEX: 20.66 KG/M2 | RESPIRATION RATE: 20 BRPM | WEIGHT: 124 LBS | OXYGEN SATURATION: 99 % | TEMPERATURE: 98 F | HEIGHT: 65 IN | HEART RATE: 92 BPM

## 2024-10-01 DIAGNOSIS — Z53.20 PROCEDURE NOT CARRIED OUT BECAUSE OF PATIENT'S DECISION: Primary | ICD-10-CM

## 2024-10-01 PROCEDURE — 99213 OFFICE O/P EST LOW 20 MIN: CPT | Mod: PBBFAC | Performed by: STUDENT IN AN ORGANIZED HEALTH CARE EDUCATION/TRAINING PROGRAM

## 2024-10-08 RX ORDER — ONDANSETRON 4 MG/1
4 TABLET, FILM COATED ORAL EVERY 8 HOURS PRN
Qty: 10 TABLET | Refills: 0 | Status: SHIPPED | OUTPATIENT
Start: 2024-10-08

## 2025-06-01 ENCOUNTER — HOSPITAL ENCOUNTER (EMERGENCY)
Facility: HOSPITAL | Age: 30
Discharge: HOME | End: 2025-06-01
Attending: EMERGENCY MEDICINE
Payer: MEDICAID

## 2025-06-01 ENCOUNTER — CLINICAL SUPPORT (OUTPATIENT)
Dept: URGENT CARE | Facility: CLINIC | Age: 30
End: 2025-06-01
Payer: MEDICAID

## 2025-06-01 VITALS
DIASTOLIC BLOOD PRESSURE: 99 MMHG | RESPIRATION RATE: 20 BRPM | HEART RATE: 99 BPM | SYSTOLIC BLOOD PRESSURE: 121 MMHG | TEMPERATURE: 98 F | OXYGEN SATURATION: 99 %

## 2025-06-01 VITALS
SYSTOLIC BLOOD PRESSURE: 131 MMHG | HEART RATE: 68 BPM | RESPIRATION RATE: 15 BRPM | HEIGHT: 65 IN | TEMPERATURE: 98 F | OXYGEN SATURATION: 100 % | BODY MASS INDEX: 20.43 KG/M2 | WEIGHT: 122.63 LBS | DIASTOLIC BLOOD PRESSURE: 97 MMHG

## 2025-06-01 DIAGNOSIS — K59.00 CONSTIPATION, UNSPECIFIED CONSTIPATION TYPE: ICD-10-CM

## 2025-06-01 DIAGNOSIS — R10.9 ABDOMINAL PAIN, UNSPECIFIED ABDOMINAL LOCATION: Primary | ICD-10-CM

## 2025-06-01 DIAGNOSIS — R74.8 ELEVATED LIPASE: Primary | ICD-10-CM

## 2025-06-01 DIAGNOSIS — K85.20 ALCOHOL-INDUCED ACUTE PANCREATITIS, UNSPECIFIED COMPLICATION STATUS: ICD-10-CM

## 2025-06-01 LAB
ALBUMIN SERPL-MCNC: 4.3 G/DL (ref 3.5–5)
ALBUMIN/GLOB SERPL: 1.4 RATIO (ref 1.1–2)
ALP SERPL-CCNC: 36 UNIT/L (ref 40–150)
ALT SERPL-CCNC: 10 UNIT/L (ref 0–55)
ANION GAP SERPL CALC-SCNC: 9 MEQ/L
AST SERPL-CCNC: 15 UNIT/L (ref 11–45)
B-HCG UR QL: NEGATIVE
BACTERIA #/AREA URNS AUTO: ABNORMAL /HPF
BASOPHILS # BLD AUTO: 0.11 X10(3)/MCL
BASOPHILS NFR BLD AUTO: 1.5 %
BILIRUB SERPL-MCNC: 0.6 MG/DL
BILIRUB UR QL STRIP.AUTO: NEGATIVE
BUN SERPL-MCNC: 10.3 MG/DL (ref 7–18.7)
CALCIUM SERPL-MCNC: 9.3 MG/DL (ref 8.4–10.2)
CHLORIDE SERPL-SCNC: 108 MMOL/L (ref 98–107)
CLARITY UR: ABNORMAL
CO2 SERPL-SCNC: 22 MMOL/L (ref 22–29)
COLOR UR AUTO: YELLOW
CREAT SERPL-MCNC: 0.74 MG/DL (ref 0.55–1.02)
CREAT/UREA NIT SERPL: 14
CTP QC/QA: YES
EOSINOPHIL # BLD AUTO: 0.05 X10(3)/MCL (ref 0–0.9)
EOSINOPHIL NFR BLD AUTO: 0.7 %
ERYTHROCYTE [DISTWIDTH] IN BLOOD BY AUTOMATED COUNT: 12.3 % (ref 11.5–17)
GFR SERPLBLD CREATININE-BSD FMLA CKD-EPI: >60 ML/MIN/1.73/M2
GLOBULIN SER-MCNC: 3 GM/DL (ref 2.4–3.5)
GLUCOSE SERPL-MCNC: 89 MG/DL (ref 74–100)
GLUCOSE UR QL STRIP: NORMAL
HCT VFR BLD AUTO: 40 % (ref 37–47)
HGB BLD-MCNC: 14 G/DL (ref 12–16)
HGB UR QL STRIP: NEGATIVE
HOLD SPECIMEN: NORMAL
HYALINE CASTS #/AREA URNS LPF: ABNORMAL /LPF
IMM GRANULOCYTES # BLD AUTO: 0.01 X10(3)/MCL (ref 0–0.04)
IMM GRANULOCYTES NFR BLD AUTO: 0.1 %
KETONES UR QL STRIP: NEGATIVE
LEUKOCYTE ESTERASE UR QL STRIP: NEGATIVE
LIPASE SERPL-CCNC: 82 U/L
LYMPHOCYTES # BLD AUTO: 3.36 X10(3)/MCL (ref 0.6–4.6)
LYMPHOCYTES NFR BLD AUTO: 46.9 %
MCH RBC QN AUTO: 32.7 PG (ref 27–31)
MCHC RBC AUTO-ENTMCNC: 35 G/DL (ref 33–36)
MCV RBC AUTO: 93.5 FL (ref 80–94)
MONOCYTES # BLD AUTO: 0.76 X10(3)/MCL (ref 0.1–1.3)
MONOCYTES NFR BLD AUTO: 10.6 %
MUCOUS THREADS URNS QL MICRO: ABNORMAL /LPF
NEUTROPHILS # BLD AUTO: 2.87 X10(3)/MCL (ref 2.1–9.2)
NEUTROPHILS NFR BLD AUTO: 40.2 %
NITRITE UR QL STRIP: NEGATIVE
NRBC BLD AUTO-RTO: 0 %
PH UR STRIP: 8.5 [PH]
PLATELET # BLD AUTO: 275 X10(3)/MCL (ref 130–400)
PMV BLD AUTO: 10.2 FL (ref 7.4–10.4)
POTASSIUM SERPL-SCNC: 3.6 MMOL/L (ref 3.5–5.1)
PROT SERPL-MCNC: 7.3 GM/DL (ref 6.4–8.3)
PROT UR QL STRIP: ABNORMAL
RBC # BLD AUTO: 4.28 X10(6)/MCL (ref 4.2–5.4)
RBC #/AREA URNS AUTO: ABNORMAL /HPF
SODIUM SERPL-SCNC: 139 MMOL/L (ref 136–145)
SP GR UR STRIP.AUTO: 1.02 (ref 1–1.03)
SQUAMOUS #/AREA URNS LPF: ABNORMAL /HPF
UROBILINOGEN UR STRIP-ACNC: NORMAL
WBC # BLD AUTO: 7.16 X10(3)/MCL (ref 4.5–11.5)
WBC #/AREA URNS AUTO: ABNORMAL /HPF

## 2025-06-01 PROCEDURE — 85025 COMPLETE CBC W/AUTO DIFF WBC: CPT

## 2025-06-01 PROCEDURE — 81025 URINE PREGNANCY TEST: CPT

## 2025-06-01 PROCEDURE — 80053 COMPREHEN METABOLIC PANEL: CPT

## 2025-06-01 PROCEDURE — 83690 ASSAY OF LIPASE: CPT

## 2025-06-01 PROCEDURE — 81001 URINALYSIS AUTO W/SCOPE: CPT

## 2025-06-01 PROCEDURE — 99284 EMERGENCY DEPT VISIT MOD MDM: CPT | Mod: 25

## 2025-06-01 RX ORDER — ONDANSETRON 4 MG/1
4 TABLET, FILM COATED ORAL EVERY 6 HOURS
Qty: 12 TABLET | Refills: 0 | Status: SHIPPED | OUTPATIENT
Start: 2025-06-01

## 2025-06-01 RX ORDER — POLYETHYLENE GLYCOL 3350 17 G/17G
17 POWDER, FOR SOLUTION ORAL DAILY
Qty: 10 PACKET | Refills: 0 | Status: SHIPPED | OUTPATIENT
Start: 2025-06-01 | End: 2025-06-11

## 2025-06-02 NOTE — ED PROVIDER NOTES
Encounter Date: 6/1/2025       History     Chief Complaint   Patient presents with    Abdominal Pain     Pt to ED with a friend. Pt is C/O pain to bilat upper abdomen since this morning,. She said it feels like waves of fire when the pain comes. She denies N/V/D.      A 30 y.o. female patient with a history of ADHD, anxiety, hyperthyroidism presents to the ED with epigastric pain radiating around to her left back, nausea without vomiting. The onset is this morning. Patient admits an episode of binge drinking last night at a friend's wedding. Denies diarrhea. Last BM 2 days ago. Denies fever, chills, sore throat, cough, congestion, CP, SOB. Patient is tolerating PO intake. No urinary symptoms.       The history is provided by the patient.     Review of patient's allergies indicates:  No Known Allergies  Past Medical History:   Diagnosis Date    ADHD     Anxiety disorder, unspecified     Hyperparathyroidism 2020    Hyperthyroidism     Ovarian cyst      Past Surgical History:   Procedure Laterality Date    EXCISION OF PAROTID GLAND Right 9/20/2024    Procedure: EXCISION, PAROTID GLAND;  Surgeon: Emir Cole MD;  Location: [facility];  Service: ENT;  Laterality: Right;    LAPAROSCOPIC SALPINGO-OOPHORECTOMY Right 10/08/2023    Procedure: SALPINGO-OOPHORECTOMY, LAPAROSCOPIC;  Surgeon: Stanley Camacho MD;  Location: [facility];  Service: OB/GYN;  Laterality: Right;     Family History   Problem Relation Name Age of Onset    Hypothyroidism Mother bailey cerda     Thyroid disease Mother bailey cerda         hypothyroidism    No Known Problems Father      Hypothyroidism Sister      Diabetes Maternal Grandmother ishan quintero     Cancer Maternal Grandfather cosmo leon         lung cancer     Social History[1]  Review of Systems   Constitutional:  Negative for chills and fever.   Eyes:  Negative for visual disturbance.   Respiratory:  Negative for shortness of breath.    Cardiovascular:  Negative for chest pain.    Gastrointestinal:  Positive for abdominal pain and nausea. Negative for abdominal distention, constipation, diarrhea and vomiting.   Genitourinary:  Negative for difficulty urinating and dysuria.   Musculoskeletal:  Negative for arthralgias.   Skin:  Negative for color change and rash.   Neurological:  Negative for weakness and headaches.   Hematological:  Does not bruise/bleed easily.   Psychiatric/Behavioral:  Negative for confusion.    All other systems reviewed and are negative.      Physical Exam     Initial Vitals [06/01/25 1326]   BP Pulse Resp Temp SpO2   111/74 100 20 98 °F (36.7 °C) 100 %      MAP       --         Physical Exam    Nursing note and vitals reviewed.  Constitutional: She appears well-developed and well-nourished.   HENT:   Head: Normocephalic and atraumatic.   Right Ear: External ear normal.   Left Ear: External ear normal.   Nose: Nose normal.   Eyes: Conjunctivae and EOM are normal.   Neck: Neck supple.   Cardiovascular:  Normal rate, regular rhythm and normal heart sounds.     Exam reveals no gallop and no friction rub.       No murmur heard.  Pulmonary/Chest: Breath sounds normal. No respiratory distress. She has no wheezes. She has no rhonchi. She has no rales.   Abdominal: Abdomen is soft. Bowel sounds are normal. She exhibits no distension and no mass. There is abdominal tenderness in the epigastric area and left upper quadrant.   No right CVA tenderness.  No left CVA tenderness. There is no rebound, no guarding, no tenderness at McBurney's point and negative Lauren's sign.   Musculoskeletal:      Cervical back: Neck supple.     Neurological: She is alert and oriented to person, place, and time. GCS eye subscore is 4. GCS verbal subscore is 5. GCS motor subscore is 6.   Skin: Skin is warm and dry. Capillary refill takes less than 2 seconds.         ED Course   Procedures  Labs Reviewed   COMPREHENSIVE METABOLIC PANEL - Abnormal       Result Value    Sodium 139      Potassium 3.6       Chloride 108 (*)     CO2 22      Glucose 89      Blood Urea Nitrogen 10.3      Creatinine 0.74      Calcium 9.3      Protein Total 7.3      Albumin 4.3      Globulin 3.0      Albumin/Globulin Ratio 1.4      Bilirubin Total 0.6      ALP 36 (*)     ALT 10      AST 15      eGFR >60      Anion Gap 9.0      BUN/Creatinine Ratio 14     LIPASE - Abnormal    Lipase Level 82 (*)    URINALYSIS, REFLEX TO URINE CULTURE - Abnormal    Color, UA Yellow      Appearance, UA Turbid (*)     Specific Gravity, UA 1.024      pH, UA 8.5      Protein, UA Trace (*)     Glucose, UA Normal      Ketones, UA Negative      Blood, UA Negative      Bilirubin, UA Negative      Urobilinogen, UA Normal      Nitrites, UA Negative      Leukocyte Esterase, UA Negative      RBC, UA 0-5      WBC, UA 0-5      Bacteria, UA None Seen      Squamous Epithelial Cells, UA Moderate (*)     Mucous, UA Trace (*)     Hyaline Casts, UA None Seen     CBC WITH DIFFERENTIAL - Abnormal    WBC 7.16      RBC 4.28      Hgb 14.0      Hct 40.0      MCV 93.5      MCH 32.7 (*)     MCHC 35.0      RDW 12.3      Platelet 275      MPV 10.2      Neut % 40.2      Lymph % 46.9      Mono % 10.6      Eos % 0.7      Basophil % 1.5      Imm Grans % 0.1      Neut # 2.87      Lymph # 3.36      Mono # 0.76      Eos # 0.05      Baso # 0.11      Imm Gran # 0.01      NRBC% 0.0     CBC W/ AUTO DIFFERENTIAL    Narrative:     The following orders were created for panel order CBC Auto Differential.  Procedure                               Abnormality         Status                     ---------                               -----------         ------                     CBC with Differential[[id number]]       Abnormal            Final result                 Please view results for these tests on the individual orders.   EXTRA TUBES    Narrative:     The following orders were created for panel order EXTRA TUBES.  Procedure                               Abnormality         Status                      ---------                               -----------         ------                     Light Blue Top Hold[[id number]]                             Final result               Gold Top Hold[[id number]]                                   Final result               Pink Top Hold[[id number]]                                   Final result                 Please view results for these tests on the individual orders.   LIGHT BLUE TOP HOLD    Extra Tube Hold for add-ons.     GOLD TOP HOLD    Extra Tube Hold for add-ons.     PINK TOP HOLD    Extra Tube Hold for add-ons.     POCT URINE PREGNANCY    POC Preg Test, Ur Negative       Acceptable Yes            Imaging Results              X-Ray Abdomen Flat And Erect (Final result)  Result time 06/01/25 15:09:47      Final result by Sue Hewitt MD (06/01/25 15:09:47)                   Impression:      Large colonic stool burden.      Electronically signed by: Sue Hewitt  Date:    06/01/2025  Time:    15:09               Narrative:    EXAMINATION:  XR ABDOMEN FLAT AND ERECT    CLINICAL HISTORY:  Unspecified abdominal pain    TECHNIQUE:  Supine and upright views of the abdomen performed.    COMPARISON:  11/07/2019    FINDINGS:  No dilated bowel loops. No evidence of obstruction.Large stool burden.    No evidence of free intraperitoneal air.    No appreciable acute osseous abnormality.                                       Medications - No data to display  Medical Decision Making  A 30 y.o. female patient with a history of ADHD, anxiety, hyperthyroidism presents to the ED with epigastric pain radiating around to her left back, nausea without vomiting. The onset is this morning. Patient admits an episode of binge drinking last night at a friend's wedding. Denies diarrhea. Last BM 2 days ago. Denies fever, chills, sore throat, cough, congestion, CP, SOB. Patient is tolerating PO intake. No urinary symptoms.     Labs remarkable for elevated lipase.  Abdomen x-ray shows significant stool burden. Patient refuses CT scan stating she feels better. Tolerating PO intake. Strict f/u precautions given.     Clinical impression:  Elevated lipase (Primary)  Alcohol-induced acute pancreatitis, unspecified complication status  Constipation, unspecified constipation type    Patient is non-toxic appearing and tolerating nutritional intake. Patient's vital signs and clinical condition are stable for DC with ED Prescriptions     Medication Sig Dispense Start Date End Date Auth. Provider    ondansetron (ZOFRAN) 4 MG tablet Take 1 tablet (4 mg total) by mouth   every 6 (six) hours. 12 tablet 6/1/2025 -- Nelli Ray PA    polyethylene glycol (MIRALAX) 17 gram PwPk Take 17 g by mouth once daily.   for 10 days 10 packet 6/1/2025 6/11/2025 Nelli Ray PA         Follow-up: PCP or Internal medicine clinic within 3 days  Referrals made: none    Strict follow-up precautions given. Patient verbalizes understanding of treatment plan and ED return precautions.       Amount and/or Complexity of Data Reviewed  Labs: ordered. Decision-making details documented in ED Course.  Radiology: ordered. Decision-making details documented in ED Course.    Risk  OTC drugs.  Prescription drug management.  Risk Details: Given strict ED return precautions. I have spoken with the patient and/or caregivers. I have explained the patient's condition, diagnoses and treatment plan based on the information available to me at this time. I have answered the patient's and/or caregiver's questions and addressed any concerns. The patient and/or caregivers have as good an understanding of the patient's diagnosis, condition and treatment plan as can be expected at this point. The patient's condition is stable and appropriate for discharge from the emergency department.      The patient will pursue further outpatient evaluation with the primary care physician or other designated or consulting physician as  outlined in the discharge instructions. The patient and/or caregivers are agreeable to this plan of care and follow-up instructions have been explained in detail. The patient and/or caregivers have received these instructions in written format and have expressed an understanding of the discharge instructions. The patient and/or caregivers are aware that any significant change in condition or worsening of symptoms should prompt an immediate return to this or the closest emergency department or a call to 911.               Additional MDM:   Differential Diagnosis:   Other: The following diagnoses were also considered and will be evaluated: GERD, PUD and Viral syndrome.            ED Course as of 06/01/25 2329   Sun Jun 01, 2025   1449 hCG Qualitative, Urine: Negative [AG]   1513 X-Ray Abdomen Flat And Erect  Large colonic stool burden.   [AG]   1535 WBC: 7.16 [AG]   1536 Hemoglobin: 14.0 [AG]   1545 Bacteria, UA: None Seen [AG]   1546 eGFR: >60 [AG]   1546 Lipase(!): 82 [AG]      ED Course User Index  [AG] Nelli Ray PA                           Clinical Impression:  Final diagnoses:  [R74.8] Elevated lipase (Primary)  [K85.20] Alcohol-induced acute pancreatitis, unspecified complication status  [K59.00] Constipation, unspecified constipation type          ED Disposition Condition    Discharge Stable          ED Prescriptions       Medication Sig Dispense Start Date End Date Auth. Provider    ondansetron (ZOFRAN) 4 MG tablet Take 1 tablet (4 mg total) by mouth every 6 (six) hours. 12 tablet 6/1/2025 -- Nelli Ray PA    polyethylene glycol (MIRALAX) 17 gram PwPk Take 17 g by mouth once daily. for 10 days 10 packet 6/1/2025 6/11/2025 Nelli Ray PA          Follow-up Information       Follow up With Specialties Details Why Contact Info    Your primary care provider  Go in 3 days      [facility] Emergency Medicine Go to  If symptoms worsen, As needed [address]  [place] [id number] [phone]                   [1]   Social History  Tobacco Use    Smoking status: Every Day     Current packs/day: 0.50     Average packs/day: 0.5 packs/day for 4.0 years (2.0 ttl pk-yrs)     Types: Vaping with nicotine, Cigarettes    Smokeless tobacco: Current   Substance Use Topics    Alcohol use: Yes     Alcohol/week: 3.0 standard drinks of alcohol     Types: 3 Glasses of wine per week    Drug use: Yes     Frequency: 14.0 times per week     Types: Amphetamines, Marijuana        Nelli Ray PA  06/01/25 9870

## (undated) DEVICE — SUT VICRYL 4-0 ANTIBACT

## (undated) DEVICE — TRAY CATH FOL SIL URIMTR 16FR

## (undated) DEVICE — PAD CURAD NONADH 3X4IN

## (undated) DEVICE — CLAMP LAPAROSHIELD MALE 45CM

## (undated) DEVICE — MANIFOLD 4 PORT

## (undated) DEVICE — CLOSURE SKIN STERI STRIP 1/2X4

## (undated) DEVICE — DEVICE ENSEAL X1 CRV JAW 37CM

## (undated) DEVICE — SOLIDIFIER BOTTLE 1500CC

## (undated) DEVICE — SPONGE GAUZE 16PLY 4X4

## (undated) DEVICE — SUT GUT PL. 4-0 27 FS-2

## (undated) DEVICE — TRAY SKIN SCRUB WET PREMIUM

## (undated) DEVICE — TROCAR ENDOPATH XCEL 5X100MM

## (undated) DEVICE — GLOVE SIGNATURE MICRO LTX 5.5

## (undated) DEVICE — BAG SPEC RETRV ENDO 4X6IN DISP

## (undated) DEVICE — GLOVE PROTEXIS HYDROGEL SZ7.5

## (undated) DEVICE — SUT VICRYL 3-0 27 SH

## (undated) DEVICE — CANNULA ENDOPATH XCEL 5X100MM

## (undated) DEVICE — TROCAR ENDOPATH XCEL 11MM 10CM

## (undated) DEVICE — PACK GYN LAPAROSCOPY HZD

## (undated) DEVICE — DRESSING TRANS 2X2 TEGADERM

## (undated) DEVICE — SHEARS HARMONIC CRVD 9 CM

## (undated) DEVICE — ELECTRODE PATIENT RETURN DISP

## (undated) DEVICE — KIT SURGICAL TURNOVER

## (undated) DEVICE — GLOVE SENSICARE PI GRN 7.5

## (undated) DEVICE — ADHESIVE MASTISOL VIAL 48/BX

## (undated) DEVICE — DRSNG POLYSKIN TRNSPAR 4X4.75

## (undated) DEVICE — ELECTRODE BLADE INSULATED 1 IN

## (undated) DEVICE — NDL 27G X 1 1/4

## (undated) DEVICE — NDL HYPO 22GX1 1/2 SYR 10ML LL

## (undated) DEVICE — GOWN POLY REINF BRTH SLV XL

## (undated) DEVICE — DRESSING SURGICAL 1/2X1/2

## (undated) DEVICE — NDL QUINCKE SPINAL 22G 1.5IN

## (undated) DEVICE — SPONGE KITTNER 1/4X 5/8 L STRL

## (undated) DEVICE — PROBE SIMULATOR KRAFF

## (undated) DEVICE — STAPLER SKIN ROTATING HEAD

## (undated) DEVICE — IRRIGATOR SUCTION W/TIP

## (undated) DEVICE — NDL HYPO REG 25G X 1 1/2

## (undated) DEVICE — SLEEVE ECLIPSE GOWN STRL 23IN

## (undated) DEVICE — SHEARS HARMONIC CRVD TIP 36CM

## (undated) DEVICE — SUT SA85H SILK 2-0

## (undated) DEVICE — GLOVE PROTEXIS HYDROGEL SZ6.5

## (undated) DEVICE — DRAIN PENROSE STD 18X1/2IN

## (undated) DEVICE — GLOVE SIGNATURE ESSNTL LTX 5.5

## (undated) DEVICE — DRESSING POLYSKIN II 2X2.75IN

## (undated) DEVICE — Device

## (undated) DEVICE — NDL ECLIPSE SAFETY 18GX1-1/2IN

## (undated) DEVICE — SYR 10CC LUER LOCK

## (undated) DEVICE — SOL NACL IRR 1000ML BTL

## (undated) DEVICE — HOOK LONE STAR SHRP ELAS 5MM

## (undated) DEVICE — SUT VICRYL PLUS 3-0 SH 18IN

## (undated) DEVICE — MANIPULATOR CLEARVIEW UTER 9CM

## (undated) DEVICE — ELECTRODE EMG NEEDLE

## (undated) DEVICE — GLOVE SIGNATURE MICRO LTX 7

## (undated) DEVICE — SUT MCRYL PLUS 4-0 PS2 27IN

## (undated) DEVICE — APPLICATOR STRL COT 2INNR 6IN

## (undated) DEVICE — CORD BIPOLAR 12 FOOT

## (undated) DEVICE — WARMER DRAPE STERILE LF

## (undated) DEVICE — CHLORAPREP 10.5 ML APPLICATOR

## (undated) DEVICE — GLOVE SIGNATURE MICRO LTX 7.5

## (undated) DEVICE — DRAIN JACKSON PRATT 10MM

## (undated) DEVICE — SUT SILK 2-0 SH 18IN BLACK

## (undated) DEVICE — GLOVE SIGNATURE ESSNTL LTX 7.5

## (undated) DEVICE — SOL IRRI STRL WATER 1000ML

## (undated) DEVICE — SOL IRR NACL .9% 1000CC

## (undated) DEVICE — SPONGE LAP 18X18 PREWASHED

## (undated) DEVICE — SUT 2/0 30IN SILK BLK BRAI